# Patient Record
Sex: FEMALE | Race: WHITE | NOT HISPANIC OR LATINO | Employment: OTHER | URBAN - METROPOLITAN AREA
[De-identification: names, ages, dates, MRNs, and addresses within clinical notes are randomized per-mention and may not be internally consistent; named-entity substitution may affect disease eponyms.]

---

## 2017-01-05 ENCOUNTER — TELEPHONE (OUTPATIENT)
Dept: FAMILY MEDICINE | Facility: CLINIC | Age: 82
End: 2017-01-05

## 2017-01-05 ENCOUNTER — PATIENT OUTREACH (OUTPATIENT)
Dept: ADMINISTRATIVE | Facility: CLINIC | Age: 82
End: 2017-01-05
Payer: MEDICARE

## 2017-01-05 DIAGNOSIS — R55 SYNCOPE, UNSPECIFIED SYNCOPE TYPE: Primary | ICD-10-CM

## 2017-01-05 NOTE — TELEPHONE ENCOUNTER
----- Message from Philomena Whitehead sent at 1/4/2017  3:52 PM CST -----  Contact: Anjelica with [facility] at [phone]  Anjelica with [facility] at [phone], Is calling for confirmation that Dr Lind will follow this patient for Home Health services. And book appointment for Hospital follow up before 1/31/17. Please advise. Thanks

## 2017-01-05 NOTE — TELEPHONE ENCOUNTER
They must have set her up at hospital?  I will follow her though as she is my patient.  Also can get in with Dr. Taylor for hospital f/u before visit with me.

## 2017-01-05 NOTE — PATIENT INSTRUCTIONS
"Near-Fainting:Uncertain Cause  Fainting (syncope) is a temporary loss of consciousness ("passing out"). It occurs when blood flow to the brain is reduced. Near-fainting ("near-syncope") is like fainting, but you do not fully "pass out."  The common minor causes of near fainting include sudden fear, pain, emotional stress, overexertion, or quickly standing up after sitting or lying for a long time.  The more serious causes for near fainting are due to either a very slow or very fast heart beat, dehydration, anemia, blood loss, problems related to the heart, or taking too much high blood pressure medicine.  The exact cause of your episode is not certain. More tests may be required. Therefore, it is important that you follow up with your doctor as advised.  Home Care:  1) Rest today. Resume your normal activities as soon as you are feeling back to normal.  2) If you become light-headed or dizzy, lie down right away or sit with your head between your knees.  3) Because we do not know the exact cause of your near fainting spell, another spell could occur without warning. Therefore, do not drive a car or use dangerous equipment. D o not take a bath alone (use a shower instead). Do not swim alone. You can resume these activities when your doctor says that you are no longer in danger of having a near fainting spell.  4) Stay well hydrated by drinking enough fluid each day.  Follow Up  with your doctor as instructed.  Get Prompt Medical Attention  if any of the following occur:  -- Another fainting spell occurs, and it is not explained by the common causes listed above  -- Chest, arm, neck, jaw, back or abdominal pain  -- Shortness of breath  -- Weakness, tingling or numbness in one side of the face, one arm or leg  -- Slurred speech, confusion, trouble walking or seeing  -- Seizure  -- Blood in vomit, stools (black or red color)  -- (In women) unexpected vaginal bleeding  © 9524-2297 [employer], [address] [facility], " [place] [address]. All rights reserved. This information is not intended as a substitute for professional medical care. Always follow your healthcare professional's instructions.

## 2017-01-10 NOTE — TELEPHONE ENCOUNTER
Spoke to tiffany and notified her that Dr. Lind will follow the pt but the pt needs to be seen by Dr. Taylor first. Tiffany verbalized understanding.

## 2017-01-13 ENCOUNTER — TELEPHONE (OUTPATIENT)
Dept: FAMILY MEDICINE | Facility: CLINIC | Age: 82
End: 2017-01-13

## 2017-01-13 NOTE — TELEPHONE ENCOUNTER
Why send that info? Is she symptomatic? If so go to ER otherwise monitor home bp and report back Monday.

## 2017-01-13 NOTE — TELEPHONE ENCOUNTER
Spoke to pt nurse and gave her the recommendation from dr euceda. She states pt was not symptomatic, but she has to report off to you when it flags as red in their system.

## 2017-01-13 NOTE — TELEPHONE ENCOUNTER
----- Message from Kat May sent at 1/13/2017  3:26 PM CST -----  America Frances with [facility] therapy stated patient's sitting blood pressure was 198/98 heart rate 70 /standing 164-81/if any questions call back at [phone].

## 2017-01-17 ENCOUNTER — TELEPHONE (OUTPATIENT)
Dept: PRIMARY CARE CLINIC | Facility: CLINIC | Age: 82
End: 2017-01-17

## 2017-01-17 ENCOUNTER — OFFICE VISIT (OUTPATIENT)
Dept: PRIMARY CARE CLINIC | Facility: CLINIC | Age: 82
End: 2017-01-17
Payer: MEDICARE

## 2017-01-17 ENCOUNTER — HOSPITAL ENCOUNTER (OUTPATIENT)
Dept: RADIOLOGY | Facility: HOSPITAL | Age: 82
Discharge: HOME | End: 2017-01-17
Attending: FAMILY MEDICINE
Payer: MEDICARE

## 2017-01-17 VITALS
SYSTOLIC BLOOD PRESSURE: 169 MMHG | RESPIRATION RATE: 16 BRPM | HEIGHT: 62 IN | BODY MASS INDEX: 24.14 KG/M2 | WEIGHT: 131.19 LBS | DIASTOLIC BLOOD PRESSURE: 62 MMHG | HEART RATE: 87 BPM

## 2017-01-17 DIAGNOSIS — M25.551 HIP PAIN, ACUTE, RIGHT: ICD-10-CM
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
## 2017-01-17 DIAGNOSIS — I95.1 ORTHOSTATIC HYPOTENSION: ICD-10-CM

## 2017-01-17 PROCEDURE — 73562 X-RAY EXAM OF KNEE 3: CPT | Mod: TC,PO,RT

## 2017-01-17 PROCEDURE — 99999 PR PBB SHADOW E&M-EST. PATIENT-LVL III: CPT | Mod: PBBFAC,,, | Performed by: FAMILY MEDICINE

## 2017-01-17 PROCEDURE — 73502 X-RAY EXAM HIP UNI 2-3 VIEWS: CPT | Mod: 26,RT,, | Performed by: RADIOLOGY

## 2017-01-17 PROCEDURE — 73562 X-RAY EXAM OF KNEE 3: CPT | Mod: 26,RT,, | Performed by: RADIOLOGY

## 2017-01-17 PROCEDURE — 73502 X-RAY EXAM HIP UNI 2-3 VIEWS: CPT | Mod: TC,PO,RT

## 2017-01-17 RX ORDER — ASCORBIC ACID 500 MG
500 TABLET ORAL DAILY
COMMUNITY
End: 2018-02-15

## 2017-01-17 RX ORDER — PANTOPRAZOLE SODIUM 40 MG/1
TABLET, DELAYED RELEASE ORAL
Refills: 11 | COMMUNITY
Start: 2017-01-05 | End: 2017-02-13 | Stop reason: SDUPTHER

## 2017-01-17 RX ORDER — FERROUS SULFATE 325(65) MG
325 TABLET, DELAYED RELEASE (ENTERIC COATED) ORAL DAILY
COMMUNITY

## 2017-01-17 NOTE — MR AVS SNAPSHOT
[facility]  [address] [facility] [address]  [place] [address]  Phone: [phone]                  Michela Martinez   2017 11:40 AM   Office Visit    Description:  Female : 1929   Provider:  Vivi Taylor MD   Department:  [facility]           Reason for Visit     Leg Pain           Diagnoses this Visit        Comments    Fall, subsequent encounter    -  Primary            To Do List           Future Appointments        Provider Department Dept Phone    3/21/2017 10:15 AM LAB, [place] [facility] [phone]      Goals (5 Years of Data)     None      [facility] On Call     [facility] Care [facility] -  Assistance  Registered nurses in the [facility] provide clinical advisement, health education, appointment booking, and other advisory services.  Call for this free service at [phone].             Medications           Message regarding Medications     Verify the changes and/or additions to your medication regime listed below are the same as discussed with your clinician today.  If any of these changes or additions are incorrect, please notify your healthcare provider.             Verify that the below list of medications is an accurate representation of the medications you are currently taking.  If none reported, the list may be blank. If incorrect, please contact your healthcare provider. Carry this list with you in case of emergency.           Current Medications     acetaminophen (TYLENOL) 325 MG tablet Take 325 mg by mouth every 6 (six) hours as needed for Pain.    ascorbic acid, vitamin C, (VITAMIN C) 500 MG tablet Take 500 mg by mouth once daily.    atorvastatin (LIPITOR) 10 MG tablet Take 1 tablet (10 mg total) by mouth every evening.    calcium-vitamin D3 (CALCIUM 500 + D) 500 mg(1,250mg) -200 unit per tablet Take 1 tablet by mouth 2 (two) times daily with meals.    clopidogrel (PLAVIX) 75 mg tablet Take 1 tablet (75 mg  "total) by mouth every morning.    cyanocobalamin (VITAMIN B-12) 500 MCG tablet Take 500 mcg by mouth every evening.     ferrous sulfate 325 (65 FE) MG EC tablet Take 325 mg by mouth once daily.    lamotrigine (LAMICTAL) 150 MG Tab Take 1 tablet (150 mg total) by mouth 2 (two) times daily.    lisinopril (PRINIVIL,ZESTRIL) 2.5 MG tablet Take 2 tablets (5 mg total) by mouth every evening.    memantine (NAMENDA) 10 MG Tab Take 1 tablet (10 mg total) by mouth 2 (two) times daily. Twice a day    midodrine (PROAMATINE) 2.5 MG Tab Take 1 tablet (2.5 mg total) by mouth 2 (two) times daily with meals. Take at 9am with morning medications and at 3pm    MULTIVIT-MINERALS/FOLIC ACID (WOMEN'S MULTIVITAMIN GUMMIES ORAL) Take 1 capsule by mouth 2 (two) times daily.    oxybutynin (DITROPAN) 5 MG Tab 1/2 tab po bid    vitamin E 1000 UNIT capsule Take 1,000 Units by mouth once daily.    pantoprazole (PROTONIX) 40 MG tablet TK 1 T PO  QAM 30 MINUTES BEFORE BREAKFAST           Clinical Reference Information           Vital Signs - Last Recorded  Most recent update: 1/17/2017 12:26 PM by Merry Galan LPN    BP Pulse Resp Ht Wt BMI    (!) 169/62 87 16 5' 2" (1.575 m) 59.5 kg (131 lb 2.8 oz) 23.99 kg/m2      Blood Pressure          Most Recent Value    BP  (!)  169/62      Allergies as of 1/17/2017     No Known Allergies      Immunizations Administered on Date of Encounter - 1/17/2017     None      Orders Placed During Today's Visit     Future Labs/Procedures Expected by Expires    X-Ray Hip 2 View Right  1/17/2017 1/17/2018    X-Ray Knee 3 View Right  1/17/2017 1/17/2018      [facility] Sign-Up     Activating your [facility] account is as easy as 1-2-3!     1) Visit [email or web address], select Sign Up Now, enter this activation code and your date of birth, then select Next.  [email or web address]  Expires: 2/18/2017 12:16 PM      2) Create a username and password to use when you visit [facility] in the future and select a security question in " case you lose your password and select Next.    3) Enter your e-mail address and click Sign Up!    Additional Information  If you have questions, please e-mail [email or web address] or call [phone] to talk to our [facility] staff. Remember, [facility] is NOT to be used for urgent needs. For medical emergencies, dial 911.

## 2017-01-17 NOTE — PROGRESS NOTES
Subjective:       Patient ID: Michela Martinez is a 87 y.o. female.    Chief Complaint: Leg Pain (right )    Transitional Care Note    Family and/or Caretaker present at visit?  Yes. Gilberto  Diagnostic tests reviewed/disposition: I have reviewed all completed as well as pending diagnostic tests at the time of discharge.  Disease/illness education: orthostatic hypotension  Home health/community services discussion/referrals: home health  Establishment or re-establishment of referral orders for community resources: No other necessary community resources.   Discussion with other health care providers: No discussion with other health care providers necessary.     Patient was hospitalized at [facility] recently. I have the hospital records and reviewed them thoroughly. During our Priority Care visit, we discussed how patient has been doing post discharge and identified any post discharge needs and concerns. I have also personally in addition to my nurse have reviewed and reconciled every medication before and after discharge. The patient has a clear understanding of what medications are currently to be taken after time was spent clarifying or correcting the medication reconciliation. The coordination of care far as follow up appointments, home health, or DME services have been done. My priority care team nursing will do follow up 24 or 48 hour phone call after this visit.         Admit Date: 12/30/2016     Discharge Date and Time: 1/4/2017  Attending Physician: Elvira Crawford MD      Reason for Admission: Syncope     Procedures Performed: * No surgery found *     Hospital Course (synopsis of major diagnoses, care, treatment, and services provided during the course of the hospital stay): 86 yo F with h/o CEA, presents with recurrent syncopal events over the past week, fourth event last night, fall was unwitnessed as patient climbed into bed and staff noted that she had a black eye this morning. 4 days  ago family member noted that patient was standing when suddenly legs gave out and patient almost fell however she was caught by her granddaughter. She had brief loss of consciousness about 30 seconds and returned quickly to baseline. Patient had an episode of urinary incontinence without event. No facial droop or focal weakness noted. She was assessed in the emergency department and had a CT head unremarkable for any acute findings. Twelve-lead EKG shows sinus bradycardia with a heart rate around 59. Patient is hypertensive. She denies any chest pain or palpitations at this time. Department Hospital medicine has been asked to admit for further evaluation of recurrent syncope and found to be profoundly orthostatic and symptomatic.       She was initially treated with IVFs, discontinued 1/1. Remains hypertensive with BP 200s while supine but then drops with standing. Patient initially was not able to ambulate much due to orthostasis. She was treated with discontinuation of Toprol and then also Norvasc. Compression stockings placed and started on midodrine. Patient seen by PT/OT and noted to have improved ambulation and improved symptoms. She is instructed to ambulate slowly to allow body to adjust to positional changes. Son instructed to check orthostatic vitals twice daily for the next week and keep log to take to clinic. Patient to go back to [facility] with HH/sitters.     Consults: neurology     Significant Diagnostic Studies: Labs:   Radiology:        Imaging Results                 US Carotid Bilateral (Final result) Result time: 12/30/16 18:12:20          Final result by Jenna Morgan MD (12/30/16 18:12:20)          Impression:     No significant stenosis of the right internal carotid.     50-69 percent stenosis of the left internal carotid.     Antegrade flow in both vertebrals.               Electronically signed by: JENNA MORGAN MD  Date:     12/30/16  Time:    18:12           Narrative:     Bilateral  color flow duplex imaging of the carotids.     Clinical history is syncope, previous endarterectomy on the right.     The right common carotid shows no significant disease. In the right palm proximal internal carotid there is a small amount of plaque present. The peak systolic velocity is 96 cm/s. The right vertebral shows antegrade flow.     The left common carotid shows no significant disease. In the left bulb and proximal internal carotid there is a portion of the vessel that is obscured by calcified plaque. The peak systolic velocity detected is 156 cm/s. The internal to common ratio is 2.5. These are both indicative of 50-69 percent stenosis. The left vertebral shows antegrade flow.                           MRI Brain Without Contrast (Final result) Result time: 12/30/16 17:31:05          Final result by Jenna Morgan MD (12/30/16 17:31:05)          Impression:     Chronic changes no superimposed acute disease is seen     Paranasal sinusitis.        Electronically signed by: JENNA MORGAN MD  Date:     12/30/16  Time:    17:31           Narrative:     MRI of the brain without contrast     Clinical history is trauma     This is compared to a CT scan from earlier in the day exam is degraded by artifact.     There is atrophy. There is increased signal in the periventricular white matter consistent with ischemia. There is no hemorrhage or extra-axial fluid collections noted. No masses are seen .no acute infarcts are noted. There are normal flow-voids seen in the carotid sinuses. The pituitary appears normal. There is paranasal sinusitis.                      CT Maxillofacial Without Contrast (Final result) Result time: 12/30/16 13:37:39          Final result by Remberto Lira MD (12/30/16 13:37:39)          Impression:        1. No displaced fracture or dislocation is appreciated.        Electronically signed by: REMBERTO LIRA MD  Date:     12/30/16  Time:    13:37           Narrative:     CT FACE WITHOUT CONTRAST  12/30/2016 at 1330     Clinical data: Clinical history is provided of fall, head and facial pain with skin laceration and bruising left more so than right anteriorly.     Comparison: CT head report same date. No previous CT head is currently available     Technique: Axial CT noncontrast images of the face were obtained. Coronal and sagittal reconstructions were obtained. Total DLP for the study is approximately 741 mGy-cm. Automatic exposure control was utilized.     Findings: There is metallic streak artifact from the oral cavity. There is some marginal mucosal thickening with lobulated contour left maxillary more so than right which could also be seen with retention cystic change. Chronic appearing periodontal disease changes are present. Postsurgical neck changes are present with clips on the right. There is symmetric overall appearance of the salivary glandular tissues. Degenerative changes of the mandibular condyles are present. The osteomeatal units are grossly patent overall. There is chronic appearing degeneration with straightening of the cervical curvature along with spurring and multilevel disc space narrowing. There is also marginal chronic appearing degenerative anterolisthesis with maintained posterior margins at C3-4. No abnormal prevertebral soft tissue swelling is appreciated. There is slight soft tissue swelling pre-zygomatic on the left.                           CT Head Without Contrast (Final result) Result time: 12/30/16 13:33:55          Final result by Bernardo Taylor MD (12/30/16 13:33:55)          Impression:        1. No acute intracranial abnormality is visualized.  2. Generalized involutional changes are seen. There is prominence of the ventricular system which appears disproportionate to the sulci. Therefore, hydrocephalus including normal pressure hydrocephalus is not excluded. Please correlate clinically.  3. There is advanced periventricular and deep white matter low attenuation  which is nonspecific, but is most commonly associated with advanced chronic microvascular ischemic changes.        Electronically signed by: BERNARDO CUI MD  Date:     12/30/16  Time:    13:33           Narrative:     CT HEAD WITHOUT CONTRAST:     CPT 75342     HISTORY:  Fall. Head injury. Left eye region laceration.     TECHNIQUE: Multiple contiguous axial images were acquired from the base of the skull and the vertex without contrast administration. Total DLP for the study is 741 mGy-cm for the patient's CT head and CT face studies. AEC, (Automated Exposure Control) was utilized to reduce radiation dose to the patient.     FINDINGS:     Generalized delusional changes are seen. There is prominence of the ventricular system which appears disproportionate to the sulci. Hydrocephalus including normal pressure hydrocephalus is not excluded. No evidence of acute intracranial hemorrhage, mass effect, midline deviation, hydrocephalus, or abnormal extra-axial fluid collection is seen. 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 There is advanced periventricular and deep white matter low attenuation which is nonspecific, but it is most commonly associated with advanced chronic microvascular ischemic changes. No evidence of acute large vessel territory ischemia is seen. MRI with diffusion weighted imaging is more sensitive in the assessment of acute ischemia. The basilar cisterns are preserved. Intracranial atherosclerosis is demonstrated. The facial and paranasal sinus findings are reported separately. The mastoid air cells are grossly aerated. No acute displaced calvarial fracture is visualized.                           X-Ray Chest PA And Lateral (Final result) Result time: 12/30/16 13:06:17     Final result by Bernardo Cui MD (12/30/16 13:06:17)     Impression:         1. No evidence of lobar type of consolidation or acute cardiac decompensation is appreciated.           Electronically signed by:  CARLENE CUI MD  Date:     12/30/16  Time:    13:06      Narrative:     Chest, PA and lateral     CPT: 15036     Clinical data: Fall. Reported cough.     Comparison: Chest x-ray 06/06/2016     Findings: Frontal and lateral views of the chest were obtained. The cardiac silhouette is at the upper low-normal in size. Vascular calcifications are seen. Retrocardiac density with central lucency is again seen, compatible with a moderate-sized hiatal hernia. Pulmonary hyperinflation with flattening of the diaphragm suggestive of COPD is seen. Increased interstitial opacities are seen which may be related to chronic pulmonary parenchymal changes. Costochondral calcification averaging is noted bilaterally. Biapical pleural thickening/scarring is seen. No evidence of lobar type consolidation, visible pneumothorax, or pleural effusion is seen. The visualized osseous structures appear demineralized. No definite acute displaced fracture is radiographically conspicuous. Surgical clips project over the right neck..                   Cardiac Graphics: Echocardiogram:   2D echo with color flow doppler:   Results for orders placed or performed during the hospital encounter of 12/30/16   2D echo with color flow doppler   Result Value Ref Range     EF 68 55 - 65     Mitral Valve Regurgitation MILD       Diastolic Dysfunction Yes (A)       Est. PA Systolic Pressure 24       Pericardial Effusion SMALL (A)       Tricuspid Valve Regurgitation TRIVIAL           Final Diagnoses:   Principal Problem: Syncope  Secondary Diagnoses:       HPI  Review of Systems   Constitutional: Positive for activity change, appetite change and fatigue.   HENT: Negative.    Eyes: Negative.    Respiratory: Negative.    Cardiovascular: Negative.    Gastrointestinal: Negative.    Endocrine: Negative.    Genitourinary: Negative.    Musculoskeletal: Positive for arthralgias, back pain, gait problem and joint swelling.   Skin: Negative.    Allergic/Immunologic:  "Negative.    Neurological: Positive for weakness.   Psychiatric/Behavioral: Positive for confusion.       Objective:       Vitals:    01/17/17 1222   BP: (!) 169/62   Pulse: 87   Resp: 16   Weight: 59.5 kg (131 lb 2.8 oz)   Height: 5' 2" (1.575 m)       Physical Exam   Constitutional: She appears distressed.   HENT:   Head: Normocephalic.   Mouth/Throat: No oropharyngeal exudate.   Eyes: Right eye exhibits no discharge. No scleral icterus.   Neck: Normal range of motion. Neck supple.   Cardiovascular: Normal rate and regular rhythm.    No murmur heard.  Pulmonary/Chest: Effort normal and breath sounds normal. No respiratory distress. She has no wheezes.   Abdominal: Soft. Bowel sounds are normal. She exhibits no distension. There is no tenderness.   Musculoskeletal: She exhibits edema and tenderness.   Right hip tenderness   Neurological: She displays abnormal reflex. No cranial nerve deficit. She exhibits abnormal muscle tone. Coordination abnormal.   Skin: Skin is warm. She is not diaphoretic.         Lab Results   Component Value Date    WBC 6.24 01/17/2017    HGB 11.8 (L) 01/17/2017    HCT 35.5 (L) 01/17/2017    MCV 98 01/17/2017     01/17/2017     CMP  Sodium   Date Value Ref Range Status   01/17/2017 146 (H) 136 - 145 mmol/L Final     Potassium   Date Value Ref Range Status   01/17/2017 4.4 3.5 - 5.1 mmol/L Final     Chloride   Date Value Ref Range Status   01/17/2017 107 95 - 110 mmol/L Final     CO2   Date Value Ref Range Status   01/17/2017 26 23 - 29 mmol/L Final     Glucose   Date Value Ref Range Status   01/17/2017 93 70 - 110 mg/dL Final     BUN, Bld   Date Value Ref Range Status   01/17/2017 33 (H) 8 - 23 mg/dL Final     Creatinine   Date Value Ref Range Status   01/17/2017 1.6 (H) 0.5 - 1.4 mg/dL Final     Calcium   Date Value Ref Range Status   01/17/2017 9.4 8.7 - 10.5 mg/dL Final     Total Protein   Date Value Ref Range Status   01/17/2017 6.5 6.0 - 8.4 g/dL Final     Albumin   Date Value " Ref Range Status   01/17/2017 3.6 3.5 - 5.2 g/dL Final     Total Bilirubin   Date Value Ref Range Status   01/17/2017 0.5 0.1 - 1.0 mg/dL Final     Comment:     For infants and newborns, interpretation of results should be based  on gestational age, weight and in agreement with clinical  observations.  Premature Infant recommended reference ranges:  Up to 24 hours.............<8.0 mg/dL  Up to 48 hours............<12.0 mg/dL  3-5 days..................<15.0 mg/dL  6-29 days.................<15.0 mg/dL       Alkaline Phosphatase   Date Value Ref Range Status   01/17/2017 145 (H) 55 - 135 U/L Final     AST ([place])   Date Value Ref Range Status   01/17/2016 36 14 - 36 U/L Final     AST   Date Value Ref Range Status   01/17/2017 15 10 - 40 U/L Final     ALT   Date Value Ref Range Status   01/17/2017 15 10 - 44 U/L Final     Anion Gap   Date Value Ref Range Status   01/17/2017 13 8 - 16 mmol/L Final     eGFR if    Date Value Ref Range Status   01/17/2017 33 (A) >60 mL/min/1.73 m^2 Final     eGFR if non    Date Value Ref Range Status   01/17/2017 29 (A) >60 mL/min/1.73 m^2 Final     Comment:     Calculation used to obtain the estimated glomerular filtration  rate (eGFR) is the CKD-EPI equation. Since race is unknown   in our information system, the eGFR values for   -American and Non--American patients are given   for each creatinine result.         Assessment and plan    Profound orthostatic hypotension  For now trying the see-saw effect of midodrine and lisinopril and see how it goes  No perfect solution  Compression hoses, slow sitting to standing, reviewed compliant bp log    Right hip pain, persistent after fall  Did not xray in hospital  Will check here    Anemia of chronic disease  Iron and vitamin c

## 2017-01-18 NOTE — TELEPHONE ENCOUNTER
Reviewed xrays both right hip and knee  Please notify no acute fracture  Please set up an ortho appt

## 2017-01-19 ENCOUNTER — TELEPHONE (OUTPATIENT)
Dept: PRIMARY CARE CLINIC | Facility: CLINIC | Age: 82
End: 2017-01-19

## 2017-01-19 NOTE — TELEPHONE ENCOUNTER
Spoke with HH, asked if they wanted us to send over orders for them to continue HH. Stated yes. Orders being for Dr Taylor to sign for pt to continue HH

## 2017-01-19 NOTE — TELEPHONE ENCOUNTER
----- Message from Kat May sent at 1/19/2017  2:54 PM CST -----  [facility] requesting to speak with nurse concerning signing home health orders/please call back at [phone] to advise.

## 2017-01-19 NOTE — TELEPHONE ENCOUNTER
Spoke with pts daughter Tuesday 1/17/19, notified of results and recommendations. Daughter states she will call back to schedule ortho appts

## 2017-01-30 ENCOUNTER — PATIENT OUTREACH (OUTPATIENT)
Dept: ADMINISTRATIVE | Facility: HOSPITAL | Age: 82
End: 2017-01-30

## 2017-01-30 NOTE — LETTER
January 30, 2017    Michela Martinez  [address] [place] [id number]             [facility]  [address]  [place] [address]  Phone: [phone] Dear Mrs. Martinez:    [facility] is committed to your overall health.  To help you get the most out of each of your visits, we will review your information to make sure you are up to date on all of your recommended tests and/or procedures.      Dr. Lind         has found that you may be due for:    Tetanus immunization  Pneumonia immunization    If you have had any of the above done at another facility, please bring the records or information with you so that your record at [facility] will be complete.     If you are currently taking medication , please bring it with you to your appointment for review.    If you have any questions or concerns, please don't hesitate to call.    Sincerely,  Brionna Griggs  Clinical Care Coordinator  [facility] [address] [facility] [address]  [place] [address]  Phone: [phone]   Fax: [phone]

## 2017-02-01 PROBLEM — E86.1 INTRAVASCULAR VOLUME DEPLETION: Status: ACTIVE | Noted: 2017-02-01

## 2017-02-01 PROBLEM — K92.2 ACUTE LOWER GI BLEEDING: Status: ACTIVE | Noted: 2017-02-01

## 2017-02-01 PROBLEM — R91.1 NODULE OF RIGHT LUNG: Status: ACTIVE | Noted: 2017-02-01

## 2017-02-01 PROBLEM — K52.9 COLITIS: Status: ACTIVE | Noted: 2017-02-01

## 2017-02-01 PROBLEM — N17.9 AKI (ACUTE KIDNEY INJURY): Status: ACTIVE | Noted: 2017-02-01

## 2017-02-01 PROBLEM — K52.9 COLITIS, ACUTE: Status: ACTIVE | Noted: 2017-02-01

## 2017-02-02 PROCEDURE — 99495 TRANSJ CARE MGMT MOD F2F 14D: CPT | Mod: S$PBB,,, | Performed by: FAMILY MEDICINE

## 2017-02-03 PROBLEM — K52.9 COLITIS: Status: ACTIVE | Noted: 2017-02-03

## 2017-02-04 PROBLEM — K55.039 ACUTE ISCHEMIC COLITIS: Status: ACTIVE | Noted: 2017-02-04

## 2017-02-06 ENCOUNTER — PATIENT OUTREACH (OUTPATIENT)
Dept: ADMINISTRATIVE | Facility: CLINIC | Age: 82
End: 2017-02-06
Payer: MEDICARE

## 2017-02-07 PROBLEM — K52.9 COLITIS, ACUTE: Status: ACTIVE | Noted: 2017-02-07

## 2017-02-13 ENCOUNTER — OFFICE VISIT (OUTPATIENT)
Dept: FAMILY MEDICINE | Facility: CLINIC | Age: 82
End: 2017-02-13
Payer: MEDICARE

## 2017-02-13 VITALS
SYSTOLIC BLOOD PRESSURE: 148 MMHG | BODY MASS INDEX: 24.59 KG/M2 | HEART RATE: 74 BPM | RESPIRATION RATE: 18 BRPM | DIASTOLIC BLOOD PRESSURE: 72 MMHG | WEIGHT: 133.63 LBS | HEIGHT: 62 IN | OXYGEN SATURATION: 98 %

## 2017-02-13 DIAGNOSIS — M85.80 OSTEOPENIA: ICD-10-CM

## 2017-02-13 DIAGNOSIS — Z23 NEED FOR PNEUMOCOCCAL VACCINATION: ICD-10-CM

## 2017-02-13 DIAGNOSIS — I73.9 PVD (PERIPHERAL VASCULAR DISEASE): Chronic | ICD-10-CM

## 2017-02-13 DIAGNOSIS — M43.10 SPONDYLOLISTHESIS, UNSPECIFIED SPINAL REGION: ICD-10-CM

## 2017-02-13 DIAGNOSIS — M51.36 DDD (DEGENERATIVE DISC DISEASE), LUMBAR: ICD-10-CM

## 2017-02-13 DIAGNOSIS — N18.30 CHRONIC KIDNEY DISEASE (CKD), STAGE III (MODERATE): Chronic | ICD-10-CM

## 2017-02-13 DIAGNOSIS — D69.6 THROMBOCYTOPENIA: ICD-10-CM

## 2017-02-13 DIAGNOSIS — E78.5 HYPERLIPIDEMIA LDL GOAL <100: ICD-10-CM

## 2017-02-13 DIAGNOSIS — I10 ESSENTIAL HYPERTENSION: Primary | Chronic | ICD-10-CM

## 2017-02-13 DIAGNOSIS — M48.061 NEURAL FORAMINAL STENOSIS OF LUMBAR SPINE: ICD-10-CM

## 2017-02-13 DIAGNOSIS — M48.061 LUMBAR STENOSIS: ICD-10-CM

## 2017-02-13 DIAGNOSIS — Z86.73 HISTORY OF TIA (TRANSIENT ISCHEMIC ATTACK): ICD-10-CM

## 2017-02-13 DIAGNOSIS — F03.90 DEMENTIA WITHOUT BEHAVIORAL DISTURBANCE, UNSPECIFIED DEMENTIA TYPE: ICD-10-CM

## 2017-02-13 DIAGNOSIS — M71.38 SYNOVIAL CYST OF LUMBAR FACET JOINT: ICD-10-CM

## 2017-02-13 DIAGNOSIS — I95.1 ORTHOSTATIC HYPOTENSION: ICD-10-CM

## 2017-02-13 PROBLEM — K55.039 ACUTE ISCHEMIC COLITIS: Status: RESOLVED | Noted: 2017-02-04 | Resolved: 2017-02-13

## 2017-02-13 PROBLEM — E86.1 INTRAVASCULAR VOLUME DEPLETION: Status: RESOLVED | Noted: 2017-02-01 | Resolved: 2017-02-13

## 2017-02-13 PROBLEM — R91.1 NODULE OF RIGHT LUNG: Status: RESOLVED | Noted: 2017-02-01 | Resolved: 2017-02-13

## 2017-02-13 PROBLEM — K52.9 COLITIS, ACUTE: Status: RESOLVED | Noted: 2017-02-07 | Resolved: 2017-02-13

## 2017-02-13 PROBLEM — N17.9 AKI (ACUTE KIDNEY INJURY): Status: RESOLVED | Noted: 2017-02-01 | Resolved: 2017-02-13

## 2017-02-13 PROCEDURE — 99214 OFFICE O/P EST MOD 30 MIN: CPT | Mod: S$PBB,,, | Performed by: FAMILY MEDICINE

## 2017-02-13 PROCEDURE — 99999 PR PBB SHADOW E&M-EST. PATIENT-LVL III: CPT | Mod: PBBFAC,,, | Performed by: FAMILY MEDICINE

## 2017-02-13 PROCEDURE — 99213 OFFICE O/P EST LOW 20 MIN: CPT | Mod: PBBFAC,PO | Performed by: FAMILY MEDICINE

## 2017-02-13 PROCEDURE — 90732 PPSV23 VACC 2 YRS+ SUBQ/IM: CPT | Mod: PBBFAC,PO | Performed by: FAMILY MEDICINE

## 2017-02-13 RX ORDER — LISINOPRIL 2.5 MG/1
5 TABLET ORAL NIGHTLY
Qty: 180 TABLET | Refills: 3 | Status: SHIPPED | OUTPATIENT
Start: 2017-02-13 | End: 2017-02-13

## 2017-02-13 RX ORDER — LAMOTRIGINE 150 MG/1
150 TABLET ORAL 2 TIMES DAILY
Qty: 180 TABLET | Refills: 3 | Status: SHIPPED | OUTPATIENT
Start: 2017-02-13

## 2017-02-13 RX ORDER — CLOPIDOGREL BISULFATE 75 MG/1
75 TABLET ORAL EVERY MORNING
Qty: 90 TABLET | Refills: 3 | Status: ON HOLD | OUTPATIENT
Start: 2017-02-13 | End: 2018-03-05

## 2017-02-13 RX ORDER — LISINOPRIL 5 MG/1
5 TABLET ORAL DAILY
Qty: 90 TABLET | Refills: 3 | Status: ON HOLD | OUTPATIENT
Start: 2017-02-13 | End: 2018-02-21

## 2017-02-13 RX ORDER — PANTOPRAZOLE SODIUM 40 MG/1
TABLET, DELAYED RELEASE ORAL
Qty: 90 TABLET | Refills: 3 | Status: SHIPPED | OUTPATIENT
Start: 2017-02-13 | End: 2017-05-25 | Stop reason: SDUPTHER

## 2017-02-13 RX ORDER — ATORVASTATIN CALCIUM 10 MG/1
10 TABLET, FILM COATED ORAL NIGHTLY
Qty: 90 TABLET | Refills: 3 | Status: SHIPPED | OUTPATIENT
Start: 2017-02-13

## 2017-02-13 RX ORDER — OXYBUTYNIN CHLORIDE 5 MG/1
TABLET ORAL
Qty: 90 TABLET | Refills: 3 | Status: ON HOLD | OUTPATIENT
Start: 2017-02-13 | End: 2018-02-21

## 2017-02-13 RX ORDER — MEMANTINE HYDROCHLORIDE 10 MG/1
10 TABLET ORAL 2 TIMES DAILY
Qty: 180 TABLET | Refills: 3 | Status: SHIPPED | OUTPATIENT
Start: 2017-02-13 | End: 2017-04-10

## 2017-02-13 RX ORDER — MIDODRINE HYDROCHLORIDE 2.5 MG/1
2.5 TABLET ORAL 2 TIMES DAILY WITH MEALS
Qty: 180 TABLET | Refills: 3 | Status: SHIPPED | OUTPATIENT
Start: 2017-02-13 | End: 2017-11-28 | Stop reason: SDUPTHER

## 2017-02-13 NOTE — MR AVS SNAPSHOT
[facility]  [address] [facility] [address] [place] [address]  Phone: [phone]  Fax: [phone]                  Michela Martinez   2017 1:40 PM   Office Visit    Description:  Female : 1929   Provider:  MAGGIE Lind MD   Department:  [facility]           Reason for Visit     Follow-up           Diagnoses this Visit        Comments    Essential hypertension    -  Primary     Dementia without behavioral disturbance, unspecified dementia type         PVD (peripheral vascular disease)         Orthostatic hypotension         Chronic kidney disease (CKD), stage III (moderate)         Hyperlipidemia LDL goal <100         Osteopenia         Neural foraminal stenosis of lumbar spine         DDD (degenerative disc disease), lumbar         Spondylolisthesis, unspecified spinal region         Lumbar stenosis         History of TIA (transient ischemic attack)         Synovial cyst of lumbar facet joint         Thrombocytopenia         Need for pneumococcal vaccination                To Do List           Future Appointments        Provider Department Dept Phone    3/21/2017 10:15 AM LAB, [place] [facility] [phone]    2017 10:40 AM MAGGIE Lind MD [facility] [phone]      Goals (5 Years of Data)     None      Follow-Up and Disposition     Return in about 3 months (around 2017), or if symptoms worsen or fail to improve.       These Medications        Disp Refills Start End    atorvastatin (LIPITOR) 10 MG tablet 90 tablet 3 2017     Take 1 tablet (10 mg total) by mouth every evening. - Oral    Pharmacy: [facility] [address] [place] [address] [address] AT [facility] [address] Ph #: [phone]       clopidogrel (PLAVIX) 75 mg tablet 90 tablet 3 2017     Take 1 tablet (75 mg total) by mouth every morning. - Oral    Pharmacy: [facility] [address] [facility] [address]  [place] [address] AT [place] [address] Ph #: [phone]       lamotrigine (LAMICTAL) 150 MG Tab 180 tablet 3 2/13/2017     Take 1 tablet (150 mg total) by mouth 2 (two) times daily. - Oral    Pharmacy: [facility] [place] [address] [address] [place] [address] Ph #: [phone]       memantine (NAMENDA) 10 MG Tab 180 tablet 3 2/13/2017     Take 1 tablet (10 mg total) by mouth 2 (two) times daily. Twice a day - Oral    Pharmacy: [place] [address] [address] [place] [address] Ph #: [phone]       midodrine (PROAMATINE) 2.5 MG Tab 180 tablet 3 2/13/2017 2/13/2018    Take 1 tablet (2.5 mg total) by mouth 2 (two) times daily with meals. Take at 9am with morning medications and at 3pm - Oral    Pharmacy: [facility] [place] [address] [address] [place] [address] Ph #: [phone]       oxybutynin (DITROPAN) 5 MG Tab 90 tablet 3 2/13/2017     1/2 tab po bid    Pharmacy: [facility] [place] [address] AT [place] [address] & [place] [address] Ph #: [phone]       Notes to Pharmacy: **Patient requests 90 days supply**    pantoprazole (PROTONIX) 40 MG tablet 90 tablet 3 2/13/2017     TK 1 T PO  QAM 30 MINUTES BEFORE BREAKFAST    Pharmacy: [facility] [place] [address] [address] [place] [address] Ph #: [phone]       lisinopril (PRINIVIL,ZESTRIL) 5 MG tablet 90 tablet 3 2/13/2017 2/13/2018    Take 1 tablet (5 mg total) by mouth once daily. - Oral    Pharmacy: [facility] [address] [facility] [address] AT [facility] [address] Ph #: [phone]         [facility] On Call     [facility] Nurse Care Line - 24/7 Assistance  Registered nurses in the [facility] provide clinical advisement, health education, appointment booking, and other advisory services.  Call for this free service at [phone].             Medications            Message regarding Medications     Verify the changes and/or additions to your medication regime listed below are the same as discussed with your clinician today.  If any of these changes or additions are incorrect, please notify your healthcare provider.        START taking these NEW medications        Refills    lisinopril (PRINIVIL,ZESTRIL) 5 MG tablet 3    Sig: Take 1 tablet (5 mg total) by mouth once daily.    Class: Normal    Route: Oral      STOP taking these medications     triamcinolone acetonide injection 80 mg     ciprofloxacin HCl (CIPRO) 500 MG tablet Take 1 tablet (500 mg total) by mouth 2 (two) times daily.    metronidazole (FLAGYL) 500 MG tablet Take 1 tablet (500 mg total) by mouth 3 (three) times daily.           Verify that the below list of medications is an accurate representation of the medications you are currently taking.  If none reported, the list may be blank. If incorrect, please contact your healthcare provider. Carry this list with you in case of emergency.           Current Medications     acetaminophen (TYLENOL) 325 MG tablet Take 325 mg by mouth every 6 (six) hours as needed for Pain.    ascorbic acid, vitamin C, (VITAMIN C) 500 MG tablet Take 500 mg by mouth once daily.    atorvastatin (LIPITOR) 10 MG tablet Take 1 tablet (10 mg total) by mouth every evening.    calcium-vitamin D3 (CALCIUM 500 + D) 500 mg(1,250mg) -200 unit per tablet Take 1 tablet by mouth 2 (two) times daily with meals.    clopidogrel (PLAVIX) 75 mg tablet Take 1 tablet (75 mg total) by mouth every morning.    cyanocobalamin (VITAMIN B-12) 500 MCG tablet Take 500 mcg by mouth every evening.     ferrous sulfate 325 (65 FE) MG EC tablet Take 325 mg by mouth once daily.    lamotrigine (LAMICTAL) 150 MG Tab Take 1 tablet (150 mg total) by mouth 2 (two) times daily.    memantine (NAMENDA) 10 MG Tab Take 1 tablet (10 mg total) by mouth 2 (two) times daily. Twice a day    midodrine (PROAMATINE) 2.5 MG Tab Take 1  "tablet (2.5 mg total) by mouth 2 (two) times daily with meals. Take at 9am with morning medications and at 3pm    MULTIVIT-MINERALS/FOLIC ACID (WOMEN'S MULTIVITAMIN GUMMIES ORAL) Take 1 capsule by mouth 2 (two) times daily.    oxybutynin (DITROPAN) 5 MG Tab 1/2 tab po bid    pantoprazole (PROTONIX) 40 MG tablet TK 1 T PO  QAM 30 MINUTES BEFORE BREAKFAST    vitamin E 1000 UNIT capsule Take 1,000 Units by mouth once daily.    lisinopril (PRINIVIL,ZESTRIL) 5 MG tablet Take 1 tablet (5 mg total) by mouth once daily.           Clinical Reference Information           Your Vitals Were     BP Pulse Resp Height Weight SpO2    148/72 74 18 5' 2" (1.575 m) 60.6 kg (133 lb 9.6 oz) 98%    BMI                24.44 kg/m2          Blood Pressure          Most Recent Value    BP  (!)  148/72      Allergies as of 2/13/2017     No Known Allergies      Immunizations Administered on Date of Encounter - 2/13/2017     Name Date Dose VIS Date Route    Pneumococcal Polysaccharide - 23 Valent  Incomplete 0.5 mL 4/24/2015 Intramuscular      Orders Placed During Today's Visit      Normal Orders This Visit    Pneumococcal Polysaccharide Vaccine (23 Valent) (SQ/IM)       [facility] Sign-Up     Activating your [facility] account is as easy as 1-2-3!     1) Visit [email or web address], select Sign Up Now, enter this activation code and your date of birth, then select Next.  [email or web address]  Expires: 2/18/2017 12:16 PM      2) Create a username and password to use when you visit [facility] in the future and select a security question in case you lose your password and select Next.    3) Enter your e-mail address and click Sign Up!    Additional Information  If you have questions, please e-mail [email or web address] or call [phone] to talk to our [facility] staff. Remember, [facility] is NOT to be used for urgent needs. For medical emergencies, dial 911.         Language Assistance Services     ATTENTION: Language assistance services are available, free " of charge. Please call [phone].      ATENCIÓN: Si habla español, tiene a hyman disposición servicios gratuitos de asistencia lingüística. Llame al [phone].     CHÚ Ý: N?u b?n nói Ti?ng Vi?t, có các d?ch v? h? tr? ngôn ng? mi?n phí dành cho b?n. G?i s? [phone].         [facility] complies with applicable Federal civil rights laws and does not discriminate on the basis of race, color, national origin, age, disability, or sex.

## 2017-02-13 NOTE — PROGRESS NOTES
Subjective:       Patient ID: Michela Martinez is a 87 y.o. female.    Chief Complaint: Follow-up    HPI Comments: Here for f/u chronic medical issues.  Had hand surgery last week.  States doing well overall.    Hypertension   This is a chronic problem. The current episode started more than 1 year ago. The problem is controlled. Pertinent negatives include no chest pain, palpitations or shortness of breath. Past treatments include ACE inhibitors. The current treatment provides moderate improvement. There are no compliance problems.    Hyperlipidemia   This is a chronic problem. The current episode started more than 1 year ago. Pertinent negatives include no chest pain or shortness of breath. Current antihyperlipidemic treatment includes statins. The current treatment provides moderate improvement of lipids. There are no compliance problems.      Review of Systems   Constitutional: Negative for chills, fatigue and fever.   Respiratory: Negative for cough, chest tightness and shortness of breath.    Cardiovascular: Negative for chest pain, palpitations and leg swelling.   Gastrointestinal: Negative for abdominal distention and abdominal pain.   Endocrine: Negative for cold intolerance and heat intolerance.   Skin: Negative for rash and wound.   Psychiatric/Behavioral: Negative for dysphoric mood. The patient is not nervous/anxious.        Objective:      Physical Exam   Constitutional: She appears well-developed and well-nourished.   HENT:   Head: Normocephalic and atraumatic.   Cardiovascular: Normal rate, regular rhythm and normal heart sounds.    Pulmonary/Chest: Effort normal and breath sounds normal.   Abdominal: Soft. Bowel sounds are normal.   Psychiatric: She has a normal mood and affect.   Nursing note and vitals reviewed.      Assessment:       1. Essential hypertension    2. Dementia without behavioral disturbance, unspecified dementia type    3. PVD (peripheral vascular disease)    4. Orthostatic  hypotension    5. Chronic kidney disease (CKD), stage III (moderate)    6. Hyperlipidemia LDL goal <100    7. Osteopenia    8. Neural foraminal stenosis of lumbar spine    9. DDD (degenerative disc disease), lumbar    10. Spondylolisthesis, unspecified spinal region    11. Lumbar stenosis    12. History of TIA (transient ischemic attack)    13. Synovial cyst of lumbar facet joint    14. Thrombocytopenia    15. Need for pneumococcal vaccination        Plan:       Essential hypertension    Dementia without behavioral disturbance, unspecified dementia type    PVD (peripheral vascular disease)    Orthostatic hypotension    Chronic kidney disease (CKD), stage III (moderate)    Hyperlipidemia LDL goal <100    Osteopenia    Neural foraminal stenosis of lumbar spine    DDD (degenerative disc disease), lumbar    Spondylolisthesis, unspecified spinal region    Lumbar stenosis    History of TIA (transient ischemic attack)    Synovial cyst of lumbar facet joint    Thrombocytopenia    Need for pneumococcal vaccination  -     Pneumococcal Polysaccharide Vaccine (23 Valent) (SQ/IM)    Other orders  -     atorvastatin (LIPITOR) 10 MG tablet; Take 1 tablet (10 mg total) by mouth every evening.  Dispense: 90 tablet; Refill: 3  -     clopidogrel (PLAVIX) 75 mg tablet; Take 1 tablet (75 mg total) by mouth every morning.  Dispense: 90 tablet; Refill: 3  -     lamotrigine (LAMICTAL) 150 MG Tab; Take 1 tablet (150 mg total) by mouth 2 (two) times daily.  Dispense: 180 tablet; Refill: 3  -     Discontinue: lisinopril (PRINIVIL,ZESTRIL) 2.5 MG tablet; Take 2 tablets (5 mg total) by mouth every evening.  Dispense: 180 tablet; Refill: 3  -     memantine (NAMENDA) 10 MG Tab; Take 1 tablet (10 mg total) by mouth 2 (two) times daily. Twice a day  Dispense: 180 tablet; Refill: 3  -     midodrine (PROAMATINE) 2.5 MG Tab; Take 1 tablet (2.5 mg total) by mouth 2 (two) times daily with meals. Take at 9am with morning medications and at 3pm  Dispense:  180 tablet; Refill: 3  -     oxybutynin (DITROPAN) 5 MG Tab; 1/2 tab po bid  Dispense: 90 tablet; Refill: 3  -     pantoprazole (PROTONIX) 40 MG tablet; TK 1 T PO  QAM 30 MINUTES BEFORE BREAKFAST  Dispense: 90 tablet; Refill: 3  -     lisinopril (PRINIVIL,ZESTRIL) 5 MG tablet; Take 1 tablet (5 mg total) by mouth once daily.  Dispense: 90 tablet; Refill: 3      Will monitor chronic medical issues and continue current plan of care.  Monitor home bp and report if >150/90.  Bring home machine for nurse bp check in 2 weeks.  Labs next month as scheduled.  Return in about 3 months (around 5/13/2017), or if symptoms worsen or fail to improve.

## 2017-02-15 ENCOUNTER — OUTPATIENT CASE MANAGEMENT (OUTPATIENT)
Dept: ADMINISTRATIVE | Facility: OTHER | Age: 82
End: 2017-02-15

## 2017-02-15 NOTE — PROGRESS NOTES
2/15/17-RN PRATIBHA called patient's son. Attempt to screen patient for OPCM. No answer. Message left requesting return call. Will attempt to contact at a later date.

## 2017-02-17 ENCOUNTER — OUTPATIENT CASE MANAGEMENT (OUTPATIENT)
Dept: ADMINISTRATIVE | Facility: OTHER | Age: 82
End: 2017-02-17

## 2017-02-17 NOTE — PROGRESS NOTES
2/17/17-RN CM called patient in attempt to screen patient for OPCM. Patient reported that she was doing well. Stated that she has [facility]  coming out 1-2 times weekly to assist with needs. Monitor her BP daily. Recent BP today reported was 119/60. Patient declined need for additional assistance at this time. RN CM will send patient contact information for OOC and OPCM in the event that needs develop. Will close case at this time.

## 2017-03-20 ENCOUNTER — TELEPHONE (OUTPATIENT)
Dept: FAMILY MEDICINE | Facility: CLINIC | Age: 82
End: 2017-03-20

## 2017-03-20 NOTE — TELEPHONE ENCOUNTER
Phoned pt's son, Tod in regards to message below. Scheduled an appt with Yoli Munoz NP for tomorrow, 3/21/17 at 9:20 AM. Tod voiced understanding for appt. CLC

## 2017-03-20 NOTE — TELEPHONE ENCOUNTER
Pt's son, Tod is requesting to have the pt's labs done today with a U/A due to the pt possibly having a UTI. Pt has an appt for tomorrow with Yoli Munoz NP due to increase weakness and disorientation. Please review labs pt is suppose to be having to [facility] and advise on U/A order as Tod states he will bring her in today for labs. Thank you. CLC

## 2017-03-20 NOTE — TELEPHONE ENCOUNTER
----- Message from Mary Macdonald sent at 3/20/2017  8:50 AM CDT -----  Contact: son, sarah Tran called x 3 in 4 days   Disoriented  Wants to discuss seeing Neuro. Or what steps now need to be taken   Call back

## 2017-03-21 ENCOUNTER — LAB VISIT (OUTPATIENT)
Dept: LAB | Facility: HOSPITAL | Age: 82
End: 2017-03-21
Attending: INTERNAL MEDICINE
Payer: MEDICARE

## 2017-03-21 ENCOUNTER — OFFICE VISIT (OUTPATIENT)
Dept: FAMILY MEDICINE | Facility: CLINIC | Age: 82
End: 2017-03-21
Payer: MEDICARE

## 2017-03-21 VITALS
HEIGHT: 62 IN | BODY MASS INDEX: 23.61 KG/M2 | WEIGHT: 128.31 LBS | RESPIRATION RATE: 18 BRPM | HEART RATE: 66 BPM | SYSTOLIC BLOOD PRESSURE: 168 MMHG | OXYGEN SATURATION: 97 % | DIASTOLIC BLOOD PRESSURE: 68 MMHG | TEMPERATURE: 98 F

## 2017-03-21 DIAGNOSIS — R41.0 DISORIENTATION: ICD-10-CM

## 2017-03-21 DIAGNOSIS — I73.9 PVD (PERIPHERAL VASCULAR DISEASE): ICD-10-CM

## 2017-03-21 DIAGNOSIS — R29.6 FALLING EPISODES: ICD-10-CM

## 2017-03-21 DIAGNOSIS — R10.9 RIGHT FLANK DISCOMFORT: ICD-10-CM

## 2017-03-21 DIAGNOSIS — I12.9 BENIGN HYPERTENSIVE RENAL DISEASE WITHOUT RENAL FAILURE: ICD-10-CM

## 2017-03-21 DIAGNOSIS — D69.6 THROMBOCYTOPENIA: ICD-10-CM

## 2017-03-21 DIAGNOSIS — N18.30 CHRONIC KIDNEY DISEASE (CKD), STAGE III (MODERATE): Chronic | ICD-10-CM

## 2017-03-21 DIAGNOSIS — N18.30 CHRONIC KIDNEY DISEASE (CKD), STAGE III (MODERATE): ICD-10-CM

## 2017-03-21 DIAGNOSIS — F03.91 DEMENTIA WITH BEHAVIORAL DISTURBANCE, UNSPECIFIED DEMENTIA TYPE: ICD-10-CM

## 2017-03-21 DIAGNOSIS — R41.0 DISORIENTATION: Primary | ICD-10-CM

## 2017-03-21 LAB
ALBUMIN SERPL BCP-MCNC: 3.6 G/DL
ALBUMIN SERPL BCP-MCNC: 3.8 G/DL
ALP SERPL-CCNC: 106 U/L
ALT SERPL W/O P-5'-P-CCNC: 24 U/L
ANION GAP SERPL CALC-SCNC: 10 MMOL/L
ANION GAP SERPL CALC-SCNC: 11 MMOL/L
AST SERPL-CCNC: 18 U/L
BASOPHILS # BLD AUTO: 0 K/UL
BASOPHILS NFR BLD: 0 %
BILIRUB SERPL-MCNC: 0.4 MG/DL
BILIRUB SERPL-MCNC: ABNORMAL MG/DL
BLOOD URINE, POC: ABNORMAL
BUN SERPL-MCNC: 32 MG/DL
BUN SERPL-MCNC: 33 MG/DL
CALCIUM SERPL-MCNC: 10.2 MG/DL
CALCIUM SERPL-MCNC: 10.3 MG/DL
CHLORIDE SERPL-SCNC: 103 MMOL/L
CHLORIDE SERPL-SCNC: 104 MMOL/L
CO2 SERPL-SCNC: 29 MMOL/L
CO2 SERPL-SCNC: 30 MMOL/L
COLOR, POC UA: ABNORMAL
CREAT SERPL-MCNC: 1.9 MG/DL
CREAT SERPL-MCNC: 1.9 MG/DL
DIFFERENTIAL METHOD: ABNORMAL
EOSINOPHIL # BLD AUTO: 0.2 K/UL
EOSINOPHIL NFR BLD: 2.4 %
ERYTHROCYTE [DISTWIDTH] IN BLOOD BY AUTOMATED COUNT: 12.8 %
EST. GFR  (AFRICAN AMERICAN): 26.9 ML/MIN/1.73 M^2
EST. GFR  (AFRICAN AMERICAN): 27 ML/MIN/1.73 M^2
EST. GFR  (NON AFRICAN AMERICAN): 23 ML/MIN/1.73 M^2
EST. GFR  (NON AFRICAN AMERICAN): 23.4 ML/MIN/1.73 M^2
GLUCOSE SERPL-MCNC: 87 MG/DL
GLUCOSE SERPL-MCNC: 92 MG/DL
GLUCOSE UR QL STRIP: ABNORMAL
HCT VFR BLD AUTO: 36.2 %
HGB BLD-MCNC: 12.1 G/DL
KETONES UR QL STRIP: ABNORMAL
LEUKOCYTE ESTERASE URINE, POC: ABNORMAL
LYMPHOCYTES # BLD AUTO: 1.3 K/UL
LYMPHOCYTES NFR BLD: 18.1 %
MCH RBC QN AUTO: 33.1 PG
MCHC RBC AUTO-ENTMCNC: 33.4 %
MCV RBC AUTO: 99 FL
MONOCYTES # BLD AUTO: 0.7 K/UL
MONOCYTES NFR BLD: 9.7 %
NEUTROPHILS # BLD AUTO: 5 K/UL
NEUTROPHILS NFR BLD: 69.8 %
NITRITE, POC UA: ABNORMAL
PH, POC UA: 5
PHOSPHATE SERPL-MCNC: 3.3 MG/DL
PLATELET # BLD AUTO: 128 K/UL
PMV BLD AUTO: 12.4 FL
POTASSIUM SERPL-SCNC: 4.3 MMOL/L
POTASSIUM SERPL-SCNC: 4.3 MMOL/L
PROT SERPL-MCNC: 6.3 G/DL
PROTEIN, POC: ABNORMAL
PTH-INTACT SERPL-MCNC: 51 PG/ML
RBC # BLD AUTO: 3.66 M/UL
SODIUM SERPL-SCNC: 143 MMOL/L
SODIUM SERPL-SCNC: 144 MMOL/L
SPECIFIC GRAVITY, POC UA: 1015
UROBILINOGEN, POC UA: ABNORMAL
WBC # BLD AUTO: 7.13 K/UL

## 2017-03-21 PROCEDURE — 99999 PR PBB SHADOW E&M-EST. PATIENT-LVL V: CPT | Mod: PBBFAC,,, | Performed by: NURSE PRACTITIONER

## 2017-03-21 PROCEDURE — 83970 ASSAY OF PARATHORMONE: CPT

## 2017-03-21 PROCEDURE — 84100 ASSAY OF PHOSPHORUS: CPT

## 2017-03-21 PROCEDURE — 36415 COLL VENOUS BLD VENIPUNCTURE: CPT | Mod: PO

## 2017-03-21 PROCEDURE — 80053 COMPREHEN METABOLIC PANEL: CPT | Mod: PO

## 2017-03-21 PROCEDURE — 99214 OFFICE O/P EST MOD 30 MIN: CPT | Mod: S$PBB,,, | Performed by: NURSE PRACTITIONER

## 2017-03-21 PROCEDURE — 85025 COMPLETE CBC W/AUTO DIFF WBC: CPT | Mod: PO

## 2017-03-21 RX ORDER — HYDROCODONE BITARTRATE AND ACETAMINOPHEN 5; 325 MG/1; MG/1
TABLET ORAL
Refills: 0 | COMMUNITY
Start: 2017-02-13 | End: 2017-05-16

## 2017-03-21 NOTE — PROGRESS NOTES
Subjective:       Patient ID: Michela Martinez is a 87 y.o. female.    Chief Complaint: disoriented  She was last seen in primary care by Dr. Liang on 02/13/2017. This is her first time seeing me in the clinic. She is brought in by her son. She currently resides in independent living at the [place].  HPI   She is here stating she has had about 4-5 falls within the last week. Son states she cannot follow commands and is becoming more disoriented over past three days and on yesterday she could barely get out of bed.   Vitals:    03/21/17 0938   BP: (!) 168/68   Pulse: 66   Resp: 18   Temp: 98.1 °F (36.7 °C)     BP Readings from Last 3 Encounters:   03/21/17 (!) 168/68   02/13/17 (!) 148/72   02/04/17 (!) 188/73     Wt Readings from Last 1 Encounters:   03/21/17 0938 58.2 kg (128 lb 4.9 oz)   01/17/2017 her weight was 131  09/15/016 weight was 132    CMP  Sodium   Date Value Ref Range Status   03/21/2017 144 136 - 145 mmol/L Final     Potassium   Date Value Ref Range Status   03/21/2017 4.3 3.5 - 5.1 mmol/L Final     Chloride   Date Value Ref Range Status   03/21/2017 103 95 - 110 mmol/L Final     CO2   Date Value Ref Range Status   03/21/2017 30 (H) 23 - 29 mmol/L Final     Glucose   Date Value Ref Range Status   03/21/2017 92 70 - 110 mg/dL Final     BUN, Bld   Date Value Ref Range Status   03/21/2017 33 (H) 8 - 23 mg/dL Final     Creatinine   Date Value Ref Range Status   03/21/2017 1.9 (H) 0.5 - 1.4 mg/dL Final     Calcium   Date Value Ref Range Status   03/21/2017 10.3 8.7 - 10.5 mg/dL Final     Total Protein   Date Value Ref Range Status   03/21/2017 6.3 6.0 - 8.4 g/dL Final     Albumin   Date Value Ref Range Status   03/21/2017 3.8 3.5 - 5.2 g/dL Final     Total Bilirubin   Date Value Ref Range Status   03/21/2017 0.4 0.1 - 1.0 mg/dL Final     Comment:     For infants and newborns, interpretation of results should be based  on gestational age, weight and in agreement with clinical  observations.  Premature  Infant recommended reference ranges:  Up to 24 hours.............<8.0 mg/dL  Up to 48 hours............<12.0 mg/dL  3-5 days..................<15.0 mg/dL  6-29 days.................<15.0 mg/dL       Alkaline Phosphatase   Date Value Ref Range Status   03/21/2017 106 55 - 135 U/L Final     AST ([place])   Date Value Ref Range Status   01/17/2016 36 14 - 36 U/L Final     AST   Date Value Ref Range Status   03/21/2017 18 10 - 40 U/L Final     ALT   Date Value Ref Range Status   03/21/2017 24 10 - 44 U/L Final     Anion Gap   Date Value Ref Range Status   03/21/2017 11 8 - 16 mmol/L Final     eGFR if    Date Value Ref Range Status   03/21/2017 27 (A) >60 mL/min/1.73 m^2 Final     eGFR if non    Date Value Ref Range Status   03/21/2017 23 (A) >60 mL/min/1.73 m^2 Final     Comment:     Calculation used to obtain the estimated glomerular filtration  rate (eGFR) is the CKD-EPI equation. Since race is unknown   in our information system, the eGFR values for   -American and Non--American patients are given   for each creatinine result.       Review of Systems    Her son states she had been seeing neurology on the [place] and he [place].  He states she can go from being cognitive to being disoriented within minutes  Her last CT and MRI of head were in December 2017.  Her sons stated she did have an xray on he hip by her ortho less than 1 month ago and was referred to PT and has had one session  Son states she had [facility] in December after a fall and colitis  Objective:      Physical Exam   Constitutional: She is oriented to person, place, and time.   HENT:   Head: Normocephalic and atraumatic.   Right Ear: External ear normal.   Left Ear: External ear normal.   Nose: Nose normal.   Mouth/Throat: Oropharynx is clear and moist.   Eyes: Lids are normal.   Neck: Normal range of motion. Neck  supple.   Cardiovascular: Normal rate and regular rhythm.    Pulmonary/Chest: Effort normal and breath sounds normal.   Abdominal: Soft. There is no tenderness.   Musculoskeletal: Normal range of motion.        Back:    Lymphadenopathy:        Head (right side): No submental, no submandibular, no tonsillar, no preauricular, no posterior auricular and no occipital adenopathy present.        Head (left side): No submental, no submandibular, no tonsillar, no preauricular, no posterior auricular and no occipital adenopathy present.   Neurological: She is alert and oriented to person, place, and time.   Skin: Skin is warm, dry and intact.   Psychiatric: She has a normal mood and affect. Her speech is normal and behavior is normal. Judgment and thought content normal. Cognition and memory are impaired.   Son states having periods of disorientation which are sporadic and noticeably increasing over past few days   Nursing note and vitals reviewed.      Assessment & Plan:       Disorientation  -     Ambulatory referral to Neurology  -     CBC auto differential; Future; Expected date: 3/21/17  -     Comprehensive metabolic panel; Future; Expected date: 3/21/17  -     POCT urinalysis, dipstick or tablet reag    Falling episodes  -     CBC auto differential; Future; Expected date: 3/21/17  -     Comprehensive metabolic panel; Future; Expected date: 3/21/17    Thrombocytopenia    Dementia with behavioral disturbance, unspecified dementia type    Right flank discomfort  -     POCT urinalysis, dipstick or tablet reag    Chronic kidney disease (CKD), stage III (moderate)    Other orders  -     Ambulatory referral to Home Health    Encouraged to increase fluids      No Follow-up on file.

## 2017-03-21 NOTE — MR AVS SNAPSHOT
[facility]  [address]  [place] [address]  Phone: [phone]  Fax: [phone]                  Michela Martinez   3/21/2017 9:20 AM   Office Visit    Description:  Female : 1929   Provider:  Yoli Munoz NP   Department:  [facility]           Reason for Visit     disoriented           Diagnoses this Visit        Comments    Disorientation    -  Primary     Falling episodes         Thrombocytopenia         Dementia with behavioral disturbance, unspecified dementia type         Right flank discomfort         Chronic kidney disease (CKD), stage III (moderate)                To Do List           Future Appointments        Provider Department Dept Phone    2017 10:40 AM MAGGIE Lind MD [facility] [phone]      Goals (5 Years of Data)     None      [facility]     [facility] On [facility] Nurse Care Line -  Assistance  Registered nurses in the [facility] provide clinical advisement, health education, appointment booking, and other advisory services.  Call for this free service at [phone].             Medications           Message regarding Medications     Verify the changes and/or additions to your medication regime listed below are the same as discussed with your clinician today.  If any of these changes or additions are incorrect, please notify your healthcare provider.             Verify that the below list of medications is an accurate representation of the medications you are currently taking.  If none reported, the list may be blank. If incorrect, please contact your healthcare provider. Carry this list with you in case of emergency.           Current Medications     acetaminophen (TYLENOL) 325 MG tablet Take 325 mg by mouth every 6 (six) hours as needed for Pain.    ascorbic acid, vitamin C, (VITAMIN C) 500 MG tablet Take 500 mg by mouth once daily.    atorvastatin (LIPITOR) 10 MG tablet Take 1 tablet  "(10 mg total) by mouth every evening.    calcium-vitamin D3 (CALCIUM 500 + D) 500 mg(1,250mg) -200 unit per tablet Take 1 tablet by mouth 2 (two) times daily with meals.    clopidogrel (PLAVIX) 75 mg tablet Take 1 tablet (75 mg total) by mouth every morning.    cyanocobalamin (VITAMIN B-12) 500 MCG tablet Take 500 mcg by mouth every evening.     ferrous sulfate 325 (65 FE) MG EC tablet Take 325 mg by mouth once daily.    hydrocodone-acetaminophen 5-325mg (NORCO) 5-325 mg per tablet TK 1 T PO  Q 4 TO 6 H PRN P    lamotrigine (LAMICTAL) 150 MG Tab Take 1 tablet (150 mg total) by mouth 2 (two) times daily.    lisinopril (PRINIVIL,ZESTRIL) 5 MG tablet Take 1 tablet (5 mg total) by mouth once daily.    memantine (NAMENDA) 10 MG Tab Take 1 tablet (10 mg total) by mouth 2 (two) times daily. Twice a day    midodrine (PROAMATINE) 2.5 MG Tab Take 1 tablet (2.5 mg total) by mouth 2 (two) times daily with meals. Take at 9am with morning medications and at 3pm    MULTIVIT-MINERALS/FOLIC ACID (WOMEN'S MULTIVITAMIN GUMMIES ORAL) Take 1 capsule by mouth 2 (two) times daily.    oxybutynin (DITROPAN) 5 MG Tab 1/2 tab po bid    pantoprazole (PROTONIX) 40 MG tablet TK 1 T PO  QAM 30 MINUTES BEFORE BREAKFAST    vitamin E 1000 UNIT capsule Take 1,000 Units by mouth once daily.           Clinical Reference Information           Your Vitals Were     BP Pulse Temp Resp Height Weight    168/68 66 98.1 °F (36.7 °C) (Oral) 18 5' 2" (1.575 m) 58.2 kg (128 lb 4.9 oz)    SpO2 BMI             97% 23.47 kg/m2         Blood Pressure          Most Recent Value    BP  (!)  168/68      Allergies as of 3/21/2017     No Known Allergies      Immunizations Administered on Date of Encounter - 3/21/2017     None      Orders Placed During Today's Visit      Normal Orders This Visit    Ambulatory referral to Neurology     POCT urinalysis, dipstick or tablet reag     Future Labs/Procedures Expected by Expires    CBC auto differential  3/21/2017 5/20/2018    " Comprehensive metabolic panel  3/21/2017 6/19/2017      [facility] Sign-Up     Activating your [facility] account is as easy as 1-2-3!     1) Visit [email or web address], select Sign Up Now, enter this activation code and your date of birth, then select Next.  [email or web address]  Expires: 5/5/2017 12:04 PM      2) Create a username and password to use when you visit [facility] in the future and select a security question in case you lose your password and select Next.    3) Enter your e-mail address and click Sign Up!    Additional Information  If you have questions, please e-mail [email or web address] or call [phone] to talk to our [facility] staff. Remember, [facility] is NOT to be used for urgent needs. For medical emergencies, dial 911.         Language Assistance Services     ATTENTION: Language assistance services are available, free of charge. Please call [phone].      ATENCIÓN: Si habla español, tiene a hyman disposición servicios gratuitos de asistencia lingüística. Llame al [phone].     PADDY Ý: N?u b?n nói Ti?ng Vi?t, có các d?ch v? h? tr? ngôn ng? mi?n phí dành cho b?n. G?i s? [phone].         [facility] complies with applicable Federal civil rights laws and does not discriminate on the basis of race, color, national origin, age, disability, or sex.

## 2017-03-29 ENCOUNTER — TELEPHONE (OUTPATIENT)
Dept: FAMILY MEDICINE | Facility: CLINIC | Age: 82
End: 2017-03-29

## 2017-03-29 NOTE — TELEPHONE ENCOUNTER
----- Message from Kat Olivas sent at 3/28/2017  2:52 PM CDT -----  Elba with [facility] is calling to check status of order for home health aid 02/05/17  faxed on 02/07/17 and twice since, she has not received back, contact Nazia at [phone].    Thank you

## 2017-03-29 NOTE — TELEPHONE ENCOUNTER
Spoke to Cyndee. Informed her the paperwork requested is being faxed again to her office. She states she will pass the message for Nazia to look for paperwork.

## 2017-04-04 ENCOUNTER — OFFICE VISIT (OUTPATIENT)
Dept: NEUROLOGY | Facility: CLINIC | Age: 82
End: 2017-04-04
Payer: MEDICARE

## 2017-04-04 VITALS
TEMPERATURE: 99 F | WEIGHT: 128.5 LBS | SYSTOLIC BLOOD PRESSURE: 196 MMHG | HEART RATE: 69 BPM | BODY MASS INDEX: 23.65 KG/M2 | DIASTOLIC BLOOD PRESSURE: 80 MMHG | RESPIRATION RATE: 18 BRPM | HEIGHT: 62 IN

## 2017-04-04 DIAGNOSIS — F01.518 VASCULAR DEMENTIA WITH BEHAVIOR DISTURBANCE: ICD-10-CM

## 2017-04-04 DIAGNOSIS — R41.0 EPISODIC CONFUSION: ICD-10-CM

## 2017-04-04 DIAGNOSIS — F03.91 DEMENTIA WITH BEHAVIORAL DISTURBANCE, UNSPECIFIED DEMENTIA TYPE: Primary | ICD-10-CM

## 2017-04-04 PROCEDURE — 99999 PR PBB SHADOW E&M-EST. PATIENT-LVL IV: CPT | Mod: PBBFAC,,, | Performed by: NURSE PRACTITIONER

## 2017-04-04 PROCEDURE — 99214 OFFICE O/P EST MOD 30 MIN: CPT | Mod: PBBFAC,PN | Performed by: NURSE PRACTITIONER

## 2017-04-04 PROCEDURE — 99214 OFFICE O/P EST MOD 30 MIN: CPT | Mod: S$PBB,,, | Performed by: NURSE PRACTITIONER

## 2017-04-04 RX ORDER — DONEPEZIL HYDROCHLORIDE 5 MG/1
5 TABLET, FILM COATED ORAL DAILY
Qty: 30 TABLET | Refills: 3
Start: 2017-04-04 | End: 2017-05-16 | Stop reason: SDUPTHER

## 2017-04-04 RX ORDER — DONEPEZIL HYDROCHLORIDE 5 MG/1
5 TABLET, FILM COATED ORAL NIGHTLY
Qty: 30 TABLET | Refills: 2 | Status: SHIPPED | OUTPATIENT
Start: 2017-04-04 | End: 2017-06-29 | Stop reason: SDUPTHER

## 2017-04-04 NOTE — PROGRESS NOTES
Subjective:      Michela Martinez is a 87 y.o. right handed female self-referred for evaluation and treatment of cognitive problems. She is accompanied by son. Primary caregiver is adult caretaker.  The family and the patient identify problems with changes in short term memory, day/night changes, getting disoriented outside of familiar environment and intermittent confusion. Family and patient report problems with vivid dreams. Family and patient are concerned about  Decline in cognitive function over last 3 months, however, they are not concerned about medication errors, wandering, driving, cooking and inability to maintain adequate nutrition because patient live assisted living with [facility].  Medication administration: medication managed by Assisted Living Staff.  Has had several falls over last 6 months.  Presently participating in OP PT and using walker.  Patient had 2 hand surgeries over the last 3 months.  Decline in cognitive function probably related to these episodes. Participates in multiple social activities- puzzles, card games.  Staff has noticed cognitive decline.  Patient has been followed be Dr Carranza for dementia management.  Has been taking Namenda 10 mg twice a day for several years with no side effects.  Mother and sister had AD.  Patient is very worried about having AD.  Functional Assessment:   Activities of Daily Living (ADLs):    She is independent in the following: ambulation, feeding, continence and toileting  Requires assistance with the following: bathing and hygiene and dressing  Instrumental Activities of Daily Living (IADLs):    Requires assistance with the following: son has POA and manages all of her affairs  Outside reports reviewed: ER records, imaging reports: MRI, lab reports and office notes.    The following portions of the patient's history were reviewed and updated as appropriate: past social history, past surgical history and problem list.    Review of  "Systems  A comprehensive review of systems was negative except for: Neurological: positive for gait problems, memory problems and dementia      Objective:      BP (!) 196/80 (BP Location: Left arm, Patient Position: Sitting, BP Method: Automatic)  Pulse 69  Temp 98.7 °F (37.1 °C) (Oral)   Resp 18  Ht 5' 2" (1.575 m)  Wt 58.3 kg (128 lb 8.5 oz)  BMI 23.51 kg/m2  General appearance: alert, appears stated age and cooperative  Neurologic: Mental status: Alert, oriented, thought content appropriate, MOCA was administered - Overall Score was 17/30  Normal >26  (see scanned test)  VS/Executive function-1/5; attention-4/6; verbal fluency - 5 words; abstraction-1/2; immediate recall 2/5 after 2 trials; delayed recall- 0 of 5 words (1 with cueing) at 5 mins; 0 (2 with cueing) at 10 mins.    Imaging  Reviewed CT head  12/30/16  1.  No acute intracranial abnormality is visualized.  2.  Generalized involutional changes are seen.  There is prominence of the ventricular system which appears disproportionate to the sulci.  Therefore, hydrocephalus including normal pressure hydrocephalus is not excluded.  Please correlate clinically.  3.  There is advanced periventricular and deep white matter low attenuation which is nonspecific, but is most commonly associated with advanced chronic microvascular ischemic changes.    12/30/16  Carotid US  No significant stenosis of the right internal carotid.  50-69 percent stenosis of the left internal carotid.  Antegrade flow in both vertebrals.    Lab Review     Ref. Range 12/31/2016 03:45   TSH Latest Ref Range: 0.400 - 4.000 uIU/mL 2.300       01/23/17  EEG  IMPRESSION: Normal EEG.   CLINICAL CORRELATION: The patient is an 87-year-old female who has had multiple  episodes of passing out and there is question whether she could be experiencing  seizures. This tracing, however, shows no evidence for focal cortical   dysfunction nor an epileptic process.     Assessment:      Dementia, " moderate      Plan:      Implications of diagnosis explained at length with the patient and caregiver.  All questions answered fully.  MRI brain -Reviewed films with patient and son  CT brain.- Reviewed films with patient and son  EEG -  No seizure activity noted  Donepezil 5 mg daily.  Encouraged to continue OP PT  Encouraged to continue cognitive enhancement and social activities.  Follow up in 4 months.

## 2017-04-10 RX ORDER — MEMANTINE HYDROCHLORIDE 10 MG/1
TABLET ORAL
Qty: 180 TABLET | Refills: 3 | Status: SHIPPED | OUTPATIENT
Start: 2017-04-10

## 2017-04-11 ENCOUNTER — TELEPHONE (OUTPATIENT)
Dept: FAMILY MEDICINE | Facility: CLINIC | Age: 82
End: 2017-04-11

## 2017-04-11 NOTE — TELEPHONE ENCOUNTER
----- Message from Adela Golden sent at 4/10/2017  9:56 AM CDT -----  Contact: Neelima/ Home Health  Neelima called to speak with the nurse regarding home health orders. She can be contacted at [phone].    Thanks,  Adela

## 2017-04-11 NOTE — TELEPHONE ENCOUNTER
Attempted to contact Nazia from [facility]. Was forwarded to [facility]. Will call back.     Spoke to Nazia from [facility].  She states she is still waiting on paperwork for billing to be faxed back to her. She states she resent the paperwork yesterday.

## 2017-05-16 ENCOUNTER — OFFICE VISIT (OUTPATIENT)
Dept: FAMILY MEDICINE | Facility: CLINIC | Age: 82
End: 2017-05-16
Payer: MEDICARE

## 2017-05-16 VITALS
HEART RATE: 76 BPM | BODY MASS INDEX: 23.57 KG/M2 | DIASTOLIC BLOOD PRESSURE: 84 MMHG | SYSTOLIC BLOOD PRESSURE: 132 MMHG | WEIGHT: 128.06 LBS | HEIGHT: 62 IN | RESPIRATION RATE: 18 BRPM

## 2017-05-16 DIAGNOSIS — E78.5 HYPERLIPIDEMIA LDL GOAL <100: Primary | ICD-10-CM

## 2017-05-16 DIAGNOSIS — I10 ESSENTIAL HYPERTENSION: Chronic | ICD-10-CM

## 2017-05-16 DIAGNOSIS — N18.30 CHRONIC KIDNEY DISEASE (CKD), STAGE III (MODERATE): Chronic | ICD-10-CM

## 2017-05-16 PROCEDURE — 99213 OFFICE O/P EST LOW 20 MIN: CPT | Mod: PBBFAC,PO | Performed by: FAMILY MEDICINE

## 2017-05-16 PROCEDURE — 99999 PR PBB SHADOW E&M-EST. PATIENT-LVL III: CPT | Mod: PBBFAC,,, | Performed by: FAMILY MEDICINE

## 2017-05-16 PROCEDURE — 99214 OFFICE O/P EST MOD 30 MIN: CPT | Mod: S$PBB,,, | Performed by: FAMILY MEDICINE

## 2017-05-16 NOTE — MR AVS SNAPSHOT
[facility]  [address]  [place] [address]  Phone: [phone]  Fax: [phone]                  Michela Martinez   2017 10:40 AM   Office Visit    Description:  Female : 1929   Provider:  MAGGIE Lind MD   Department:  [facility]           Reason for Visit     Hypertension           Diagnoses this Visit        Comments    Hyperlipidemia LDL goal <100    -  Primary     Chronic kidney disease (CKD), stage III (moderate)         Essential hypertension                To Do List           Future Appointments        Provider Department Dept Phone    8/15/2017 1:40 PM MAGGIE Lind MD [facility] [phone]      Goals (5 Years of Data)     None      Follow-Up and Disposition     Return in about 3 months (around 2017), or if symptoms worsen or fail to improve.    Follow-up and Disposition History      [facility] On Call     [facility] Nurse Care Line -  Assistance  Unless otherwise directed by your provider, please contact [facility], our nurse care line that is available for  assistance.     Registered nurses in the [facility] provide: appointment scheduling, clinical advisement, health education, and other advisory services.  Call: [phone] (toll free)               Medications           Message regarding Medications     Verify the changes and/or additions to your medication regime listed below are the same as discussed with your clinician today.  If any of these changes or additions are incorrect, please notify your healthcare provider.        STOP taking these medications     hydrocodone-acetaminophen 5-325mg (NORCO) 5-325 mg per tablet TK 1 T PO  Q 4 TO 6 H PRN P           Verify that the below list of medications is an accurate representation of the medications you are currently taking.  If none reported, the list may be blank. If incorrect, please contact your healthcare provider.  "Carry this list with you in case of emergency.           Current Medications     acetaminophen (TYLENOL) 325 MG tablet Take 325 mg by mouth every 6 (six) hours as needed for Pain.    ascorbic acid, vitamin C, (VITAMIN C) 500 MG tablet Take 500 mg by mouth once daily.    atorvastatin (LIPITOR) 10 MG tablet Take 1 tablet (10 mg total) by mouth every evening.    calcium-vitamin D3 (CALCIUM 500 + D) 500 mg(1,250mg) -200 unit per tablet Take 1 tablet by mouth 2 (two) times daily with meals.    clopidogrel (PLAVIX) 75 mg tablet Take 1 tablet (75 mg total) by mouth every morning.    cyanocobalamin (VITAMIN B-12) 500 MCG tablet Take 500 mcg by mouth every evening.     donepezil (ARICEPT) 5 MG tablet Take 1 tablet (5 mg total) by mouth every evening.    ferrous sulfate 325 (65 FE) MG EC tablet Take 325 mg by mouth once daily.    lamotrigine (LAMICTAL) 150 MG Tab Take 1 tablet (150 mg total) by mouth 2 (two) times daily.    lisinopril (PRINIVIL,ZESTRIL) 5 MG tablet Take 1 tablet (5 mg total) by mouth once daily.    memantine (NAMENDA) 10 MG Tab TAKE 1 TABLET BY MOUTH TWICE DAILY    midodrine (PROAMATINE) 2.5 MG Tab Take 1 tablet (2.5 mg total) by mouth 2 (two) times daily with meals. Take at 9am with morning medications and at 3pm    MULTIVIT-MINERALS/FOLIC ACID (WOMEN'S MULTIVITAMIN GUMMIES ORAL) Take 1 capsule by mouth 2 (two) times daily.    oxybutynin (DITROPAN) 5 MG Tab 1/2 tab po bid    pantoprazole (PROTONIX) 40 MG tablet TK 1 T PO  QAM 30 MINUTES BEFORE BREAKFAST    vitamin E 1000 UNIT capsule Take 1,000 Units by mouth once daily.           Clinical Reference Information           Your Vitals Were     BP Pulse Resp Height Weight BMI    132/84 (BP Location: Left arm, Patient Position: Sitting) 76 18 5' 2" (1.575 m) 58.1 kg (128 lb 1.4 oz) 23.43 kg/m2      Blood Pressure          Most Recent Value    BP  132/84      Allergies as of 5/16/2017     No Known Allergies      Immunizations Administered on Date of Encounter - " 5/16/2017     None      [facility] Sign-Up     Activating your [facility] account is as easy as 1-2-3!     1) Visit [email or web address], select Sign Up Now, enter this activation code and your date of birth, then select Next.  [email or web address]  Expires: 6/30/2017 11:18 AM      2) Create a username and password to use when you visit [facility] in the future and select a security question in case you lose your password and select Next.    3) Enter your e-mail address and click Sign Up!    Additional Information  If you have questions, please e-mail [email or web address] or call [phone] to talk to our [facility] staff. Remember, [facility] is NOT to be used for urgent needs. For medical emergencies, dial 911.         Language Assistance Services     ATTENTION: Language assistance services are available, free of charge. Please call [phone].      ATENCIÓN: Si erenla yolis, tiene a hyman disposición servicios gratuitos de asistencia lingüística. Llame al [phone].     PADDY Ý: N?u b?n nói Ti?ng Vi?t, có các d?ch v? h? tr? ngôn ng? mi?n phí dành cho b?n. G?i s? [phone].         [facility] complies with applicable Federal civil rights laws and does not discriminate on the basis of race, color, national origin, age, disability, or sex.

## 2017-05-16 NOTE — PROGRESS NOTES
Subjective:       Patient ID: Michela Martinez is a 87 y.o. female.    Chief Complaint: Hypertension (Patient has no complaints today. )    HPI Comments: Here for f/u chronic medical issues.  States doing better since last visit.      Hypertension   This is a chronic problem. The current episode started more than 1 year ago. The problem is controlled. Pertinent negatives include no chest pain, palpitations or shortness of breath. Past treatments include ACE inhibitors.   Hyperlipidemia   This is a chronic problem. The current episode started more than 1 year ago. The problem is controlled. Pertinent negatives include no chest pain or shortness of breath. Current antihyperlipidemic treatment includes statins.     Review of Systems   Constitutional: Negative for chills, fatigue and fever.   Respiratory: Negative for cough, chest tightness and shortness of breath.    Cardiovascular: Negative for chest pain, palpitations and leg swelling.   Gastrointestinal: Negative for abdominal distention and abdominal pain.   Endocrine: Negative for cold intolerance and heat intolerance.   Skin: Negative for rash and wound.   Psychiatric/Behavioral: Negative for confusion, dysphoric mood, sleep disturbance and suicidal ideas. The patient is not nervous/anxious.        Objective:      Physical Exam   Constitutional: She appears well-developed and well-nourished.   Cardiovascular: Normal rate, regular rhythm and normal heart sounds.    Pulmonary/Chest: Effort normal and breath sounds normal.   Abdominal: Soft. Bowel sounds are normal.   Psychiatric: She has a normal mood and affect.   Nursing note and vitals reviewed.      Assessment:       1. Hyperlipidemia LDL goal <100    2. Chronic kidney disease (CKD), stage III (moderate)    3. Essential hypertension        Plan:       Hyperlipidemia LDL goal <100    Chronic kidney disease (CKD), stage III (moderate)    Essential hypertension      Will monitor chronic medical issues and continue  current plan of care.      Return in about 3 months (around 8/16/2017), or if symptoms worsen or fail to improve.

## 2017-05-26 RX ORDER — PANTOPRAZOLE SODIUM 40 MG/1
TABLET, DELAYED RELEASE ORAL
Qty: 90 TABLET | Refills: 1 | Status: SHIPPED | OUTPATIENT
Start: 2017-05-26 | End: 2017-11-28 | Stop reason: CLARIF

## 2017-06-09 ENCOUNTER — TELEPHONE (OUTPATIENT)
Dept: FAMILY MEDICINE | Facility: CLINIC | Age: 82
End: 2017-06-09

## 2017-06-22 ENCOUNTER — TELEPHONE (OUTPATIENT)
Dept: FAMILY MEDICINE | Facility: CLINIC | Age: 82
End: 2017-06-22

## 2017-06-30 RX ORDER — DONEPEZIL HYDROCHLORIDE 5 MG/1
TABLET, FILM COATED ORAL
Qty: 30 TABLET | Refills: 2 | Status: SHIPPED | OUTPATIENT
Start: 2017-06-30 | End: 2017-09-15 | Stop reason: SDUPTHER

## 2017-09-14 NOTE — TELEPHONE ENCOUNTER
Pt. Son Tod came by the clinic today. Stated that he and his siblings have noticed great improvement in his mother since starting the donzepril.  He is requesting that the prescription be changed to a 90 tablets rather than 30 tablets so he can refill every 3 months.   Pt. Has a week left of medication and needs a refill sent to [facility].

## 2017-09-15 RX ORDER — DONEPEZIL HYDROCHLORIDE 5 MG/1
5 TABLET, FILM COATED ORAL NIGHTLY
Qty: 90 TABLET | Refills: 2 | Status: SHIPPED | OUTPATIENT
Start: 2017-09-15 | End: 2017-10-19

## 2017-09-15 RX ORDER — DONEPEZIL HYDROCHLORIDE 5 MG/1
TABLET, FILM COATED ORAL
Qty: 30 TABLET | Refills: 0 | Status: SHIPPED | OUTPATIENT
Start: 2017-09-15 | End: 2017-10-19

## 2017-10-19 ENCOUNTER — OFFICE VISIT (OUTPATIENT)
Dept: NEUROLOGY | Facility: CLINIC | Age: 82
End: 2017-10-19
Payer: MEDICARE

## 2017-10-19 VITALS
HEART RATE: 66 BPM | DIASTOLIC BLOOD PRESSURE: 76 MMHG | BODY MASS INDEX: 24.68 KG/M2 | RESPIRATION RATE: 20 BRPM | WEIGHT: 134.13 LBS | HEIGHT: 62 IN | SYSTOLIC BLOOD PRESSURE: 140 MMHG

## 2017-10-19 DIAGNOSIS — F03.91 DEMENTIA WITH BEHAVIORAL DISTURBANCE, UNSPECIFIED DEMENTIA TYPE: Primary | ICD-10-CM

## 2017-10-19 DIAGNOSIS — R41.0 EPISODIC CONFUSION: ICD-10-CM

## 2017-10-19 PROCEDURE — 99214 OFFICE O/P EST MOD 30 MIN: CPT | Mod: PBBFAC,PN | Performed by: NURSE PRACTITIONER

## 2017-10-19 PROCEDURE — 99999 PR PBB SHADOW E&M-EST. PATIENT-LVL IV: CPT | Mod: PBBFAC,,, | Performed by: NURSE PRACTITIONER

## 2017-10-19 PROCEDURE — 99214 OFFICE O/P EST MOD 30 MIN: CPT | Mod: S$PBB,,, | Performed by: NURSE PRACTITIONER

## 2017-10-19 RX ORDER — DONEPEZIL HYDROCHLORIDE 10 MG/1
10 TABLET, FILM COATED ORAL NIGHTLY
Qty: 30 TABLET | Refills: 11 | Status: SHIPPED | OUTPATIENT
Start: 2017-10-19 | End: 2017-10-23 | Stop reason: SDUPTHER

## 2017-10-19 RX ORDER — LISINOPRIL 2.5 MG/1
1 TABLET ORAL DAILY
COMMUNITY
Start: 2017-09-19 | End: 2017-11-08

## 2017-10-19 NOTE — PATIENT INSTRUCTIONS
Increase Donepezil (Aricept) to 10 mg daily.  If you notice any adverse side effects, (nausea, diarrhea, vivid dreaming) stop medication and notify Rosa BRIAN    Our records indicate that your mother is taking Lisinopril 5 mg once a day for hypertension.  The prescription was e [facility] by Dr Lind to [facility].  The medication list from you eports that you are giving her Lisinopril 2.5 mg (2 tablets) daily.  I just want to make sure she is taking just a total of 5 mg daily.

## 2017-10-24 RX ORDER — DONEPEZIL HYDROCHLORIDE 10 MG/1
10 TABLET, FILM COATED ORAL NIGHTLY
Qty: 90 TABLET | Refills: 3 | Status: ON HOLD | OUTPATIENT
Start: 2017-10-24 | End: 2018-02-21

## 2017-10-24 RX ORDER — DONEPEZIL HYDROCHLORIDE 5 MG/1
TABLET, FILM COATED ORAL
Qty: 30 TABLET | Refills: 0 | OUTPATIENT
Start: 2017-10-24

## 2017-10-24 NOTE — PROGRESS NOTES
Subjective:      Michela Martinez is a 88 y.o. right handed female self-referred for evaluation and treatment of cognitive problems. She is accompanied by son. Primary caregiver is adult caretaker.  The family and the patient identify problems with changes in short term memory, day/night changes, getting disoriented outside of familiar environment and intermittent confusion. Family and patient report problems with vivid dreams. Family and patient are concerned about  Decline in cognitive function over last 3 months, however, they are not concerned about medication errors, wandering, driving, cooking and inability to maintain adequate nutrition because patient live assisted living with [facility] [place].  Medication administration: medication managed by Assisted Living Staff.  Has had several falls over last 6 months.  Presently participating in OP PT and using walker.  Patient had 2 hand surgeries over the last 3 months.  Decline in cognitive function probably related to these episodes. Participates in multiple social activities- puzzles, card games.  Staff has noticed cognitive decline.  Patient has been followed be Dr Carranza for dementia management.  Has been taking Namenda 10 mg twice a day for several years with no side effects.  Mother and sister had AD.  Patient is very worried about having AD.    1019/17 Interval History-  Here for follow up.  Comes to appointment alone.  Son was unable to come to appointment today.  Patient continues care at [place].  Feels she has been doing well.Continues on Namenda 10 mg twice a day.  Started on Donepezil titration at her last visit.  Continues of Donepezil 5 mg with no adverse side effects. Patient is repetitive during this visit.   Functional Assessment:   Activities of Daily Living (ADLs):    She is independent in the following: ambulation, feeding, continence and toileting  Requires assistance with the following: bathing and hygiene and dressing  Instrumental  "Activities of Daily Living (IADLs):    Requires assistance with the following: son has POA and manages all of her affairs  Outside reports reviewed: ER records, imaging reports: MRI, lab reports and office notes.    The following portions of the patient's history were reviewed and updated as appropriate: past social history, past surgical history and problem list.    Review of Systems  A comprehensive review of systems was negative except for: Neurological: positive for gait problems, memory problems and dementia      Objective:      BP (!) 140/76   Pulse 66   Resp 20   Ht 5' 2" (1.575 m)   Wt 60.8 kg (134 lb 1.6 oz)   BMI 24.53 kg/m²   General appearance: alert, appears stated age and cooperative  Neurologic: Mental status: Alert, oriented, thought content appropriate, MOCA was administered - Overall Score was 17/30  Normal >26  (see scanned test)  VS/Executive function-1/5; attention-4/6; verbal fluency - 5 words; abstraction-1/2; immediate recall 2/5 after 2 trials; delayed recall- 0 of 5 words (1 with cueing) at 5 mins; 0 (2 with cueing) at 10 mins.    Imaging  Reviewed CT head  12/30/16  1.  No acute intracranial abnormality is visualized.  2.  Generalized involutional changes are seen.  There is prominence of the ventricular system which appears disproportionate to the sulci.  Therefore, hydrocephalus including normal pressure hydrocephalus is not excluded.  Please correlate clinically.  3.  There is advanced periventricular and deep white matter low attenuation which is nonspecific, but is most commonly associated with advanced chronic microvascular ischemic changes.    12/30/16  Carotid US  No significant stenosis of the right internal carotid.  50-69 percent stenosis of the left internal carotid.  Antegrade flow in both vertebrals.    Lab Review     Ref. Range 12/31/2016 03:45   TSH Latest Ref Range: 0.400 - 4.000 uIU/mL 2.300       01/23/17  EEG  IMPRESSION: Normal EEG.   CLINICAL CORRELATION: The " patient is an 87-year-old female who has had multiple  episodes of passing out and there is question whether she could be experiencing  seizures. This tracing, however, shows no evidence for focal cortical   dysfunction nor an epileptic process.     Assessment:      Dementia, moderate      Plan:      Implications of diagnosis explained at length with the patient and caregiver.  All questions answered fully.  MRI brain -Reviewed films with patient and son last visit  CT brain.- Reviewed films with patient and son last visit  EEG -  No seizure activity noted  Increase Donepezil to 10 mg daily   Prescription sent to pharmacy with notes to her son and [facility] staff.  Encouraged to continue cognitive enhancement and social activities.  Follow up in 4 months.

## 2017-11-28 ENCOUNTER — OFFICE VISIT (OUTPATIENT)
Dept: FAMILY MEDICINE | Facility: CLINIC | Age: 82
End: 2017-11-28
Payer: MEDICARE

## 2017-11-28 VITALS
RESPIRATION RATE: 14 BRPM | SYSTOLIC BLOOD PRESSURE: 142 MMHG | OXYGEN SATURATION: 99 % | HEIGHT: 62 IN | TEMPERATURE: 98 F | DIASTOLIC BLOOD PRESSURE: 60 MMHG | HEART RATE: 66 BPM | WEIGHT: 134.69 LBS | BODY MASS INDEX: 24.78 KG/M2

## 2017-11-28 DIAGNOSIS — I10 ESSENTIAL HYPERTENSION: Primary | Chronic | ICD-10-CM

## 2017-11-28 DIAGNOSIS — I95.1 ORTHOSTATIC HYPOTENSION: ICD-10-CM

## 2017-11-28 DIAGNOSIS — F02.818 DEMENTIA ASSOCIATED WITH OTHER UNDERLYING DISEASE WITH BEHAVIORAL DISTURBANCE: ICD-10-CM

## 2017-11-28 DIAGNOSIS — N18.30 CHRONIC KIDNEY DISEASE (CKD), STAGE III (MODERATE): Chronic | ICD-10-CM

## 2017-11-28 PROCEDURE — 99999 PR PBB SHADOW E&M-EST. PATIENT-LVL IV: CPT | Mod: PBBFAC,,, | Performed by: NURSE PRACTITIONER

## 2017-11-28 PROCEDURE — 99214 OFFICE O/P EST MOD 30 MIN: CPT | Mod: S$PBB,,, | Performed by: NURSE PRACTITIONER

## 2017-11-28 PROCEDURE — 99214 OFFICE O/P EST MOD 30 MIN: CPT | Mod: PBBFAC,PO | Performed by: NURSE PRACTITIONER

## 2017-11-28 RX ORDER — MIDODRINE HYDROCHLORIDE 2.5 MG/1
2.5 TABLET ORAL 2 TIMES DAILY WITH MEALS
Qty: 180 TABLET | Refills: 1 | Status: ON HOLD | OUTPATIENT
Start: 2017-11-28 | End: 2018-02-20

## 2017-11-28 NOTE — PROGRESS NOTES
Subjective:       Patient ID: Michela Martinez is a 88 y.o. female.    Chief Complaint: Follow-up (hip and back pain)  She was last seen in primary care by Dr. Lind on 05/16/2017. I last saw her on 03/21/2017. She is accompanied today by her son.  HPI   She is here for her regular 6 month appt for chronic medical diagnosis. She currently resides at the [facility] living [facility]. She was seen in ED on 11/08/2017 with diarrhea. The diarrhea has completely subsided.   Vitals:    11/28/17 0939   BP: (!) 142/60   Pulse: 66   Resp: 14   Temp: 97.6 °F (36.4 °C)     BP Readings from Last 3 Encounters:   11/28/17 (!) 142/60   11/08/17 (!) 172/78   10/19/17 (!) 140/76     CMP  Sodium   Date Value Ref Range Status   11/08/2017 142 136 - 145 mmol/L Final     Potassium   Date Value Ref Range Status   11/08/2017 4.5 3.5 - 5.1 mmol/L Final     Chloride   Date Value Ref Range Status   11/08/2017 106 95 - 110 mmol/L Final     CO2   Date Value Ref Range Status   11/08/2017 29 22 - 31 mmol/L Final     Glucose   Date Value Ref Range Status   11/08/2017 95 70 - 110 mg/dL Final     Comment:     The ADA recommends the following guidelines for fasting glucose:  Normal:       less than 100 mg/dL  Prediabetes:  100 mg/dL to 125 mg/dL  Diabetes:     126 mg/dL or higher       BUN, Bld   Date Value Ref Range Status   11/08/2017 35 (H) 7 - 18 mg/dL Final     Creatinine   Date Value Ref Range Status   11/08/2017 1.35 0.50 - 1.40 mg/dL Final     Calcium   Date Value Ref Range Status   11/08/2017 10.3 (H) 8.4 - 10.2 mg/dL Final     Total Protein   Date Value Ref Range Status   11/08/2017 7.0 6.0 - 8.4 g/dL Final     Albumin   Date Value Ref Range Status   11/08/2017 4.2 3.5 - 5.2 g/dL Final     Total Bilirubin   Date Value Ref Range Status   11/08/2017 0.7 0.2 - 1.3 mg/dL Final     Alkaline Phosphatase   Date Value Ref Range Status   11/08/2017 111 38 - 145 U/L Final     AST ([facility])   Date Value Ref Range Status   01/17/2016 36 14  - 36 U/L Final     AST   Date Value Ref Range Status   11/08/2017 30 14 - 36 U/L Final     ALT   Date Value Ref Range Status   11/08/2017 30 10 - 44 U/L Final     Anion Gap   Date Value Ref Range Status   11/08/2017 7 (L) 8 - 16 mmol/L Final     eGFR if    Date Value Ref Range Status   11/08/2017 40 (A) >60 mL/min/1.73 m^2 Final     eGFR if non    Date Value Ref Range Status   11/08/2017 35 (A) >60 mL/min/1.73 m^2 Final     Comment:     Calculation used to obtain the estimated glomerular filtration  rate (eGFR) is the CKD-EPI equation.        Review of Systems    She is seeing Dr. Alexandre  Objective:      Physical Exam   Constitutional: She is oriented to person, place, and time. Vital signs are normal. She appears well-developed and well-nourished. She is cooperative.   She is using a roller walker to help with safe ambulation   HENT:   Head: Normocephalic and atraumatic.   Right Ear: Hearing and external ear normal.   Left Ear: Hearing and external ear normal.   Nose: Nose normal.   Mouth/Throat: Oropharynx is clear and moist.   Eyes: Lids are normal.   Neck: Normal range of motion and full passive range of motion without pain. Neck supple.   Cardiovascular: Normal rate, regular rhythm, S1 normal and normal heart sounds.    Pulmonary/Chest: Effort normal and breath sounds normal.   Abdominal: Soft. Bowel sounds are normal. There is no tenderness.   Musculoskeletal:        Feet:    Lymphadenopathy:        Head (right side): No submental, no submandibular, no tonsillar, no preauricular, no posterior auricular and no occipital adenopathy present.        Head (left side): No submental, no submandibular, no tonsillar, no preauricular, no posterior auricular and no occipital adenopathy present.     She has no cervical adenopathy.   Neurological: She is alert and oriented to person, place, and time.   Skin: Skin is warm, dry and intact.   Psychiatric: She has a normal mood and affect. Her  speech is normal and behavior is normal. Judgment and thought content normal. Cognition and memory are impaired.   Son states she has some impaired memory   Nursing note and vitals reviewed.      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 Take at 9am with morning medications and at 3pm  Dispense: 180 tablet; Refill: 1    Chronic kidney disease (CKD), stage III (moderate)-Stable  She has not seen her nephrologist since 206. She does need a follow up.    No changes have been made to her daily medication regimen.   She has had her flu vaccine at the [facility] but does not know the date.  Medication List with Changes/Refills   Current Medications    ACETAMINOPHEN (TYLENOL) 325 MG TABLET    Take 325 mg by mouth every 6 (six) hours as needed for Pain.    ASCORBIC ACID, VITAMIN C, (VITAMIN C) 500 MG TABLET    Take 500 mg by mouth once daily.    ATORVASTATIN (LIPITOR) 10 MG TABLET    Take 1 tablet (10 mg total) by mouth every evening.    CALCIUM-VITAMIN D3 (CALCIUM 500 + D) 500 MG(1,250MG) -200 UNIT PER TABLET    Take 1 tablet by mouth 2 (two) times daily with meals.    CLOPIDOGREL (PLAVIX) 75 MG TABLET    Take 1 tablet (75 mg total) by mouth every morning.    CYANOCOBALAMIN (VITAMIN B-12) 500 MCG TABLET    Take 500 mcg by mouth every evening.     DONEPEZIL (ARICEPT) 10 MG TABLET    Take 1 tablet (10 mg total) by mouth every evening.    FERROUS SULFATE 325 (65 FE) MG EC TABLET    Take 325 mg by mouth once daily.    LAMOTRIGINE (LAMICTAL) 150 MG TAB    Take 1 tablet (150 mg total) by mouth 2 (two) times daily.    LISINOPRIL (PRINIVIL,ZESTRIL) 5 MG TABLET    Take 1 tablet (5 mg total) by mouth once daily.    MEMANTINE (NAMENDA) 10 MG TAB    TAKE 1 TABLET BY  MOUTH TWICE DAILY    MULTIVIT-MINERALS/FOLIC ACID (WOMEN'S MULTIVITAMIN GUMMIES ORAL)    Take 1 capsule by mouth 2 (two) times daily.    OXYBUTYNIN (DITROPAN) 5 MG TAB    1/2 tab po bid    VITAMIN E 1000 UNIT CAPSULE    Take 1,000 Units by mouth once daily.   Changed and/or Refilled Medications    Modified Medication Previous Medication    MIDODRINE (PROAMATINE) 2.5 MG TAB midodrine (PROAMATINE) 2.5 MG Tab       Take 1 tablet (2.5 mg total) by mouth 2 (two) times daily with meals. Take at 9am with morning medications and at 3pm    Take 1 tablet (2.5 mg total) by mouth 2 (two) times daily with meals. Take at 9am with morning medications and at 3pm   Discontinued Medications    DICYCLOMINE (BENTYL) 20 MG TABLET    Take 1 tablet (20 mg total) by mouth 2 (two) times daily.    PANTOPRAZOLE (PROTONIX) 40 MG TABLET    TAKE 1 TABLET BY MOUTH EVERY MORNING 30 MINUTES BEFORE BREAKFAST         Return in about 4 months (around 3/28/2018), or if symptoms worsen or fail to improve.

## 2017-12-07 ENCOUNTER — OFFICE VISIT (OUTPATIENT)
Dept: PAIN MEDICINE | Facility: CLINIC | Age: 82
End: 2017-12-07
Payer: MEDICARE

## 2017-12-07 VITALS
HEART RATE: 62 BPM | HEIGHT: 62 IN | WEIGHT: 134.38 LBS | DIASTOLIC BLOOD PRESSURE: 80 MMHG | BODY MASS INDEX: 24.73 KG/M2 | SYSTOLIC BLOOD PRESSURE: 140 MMHG

## 2017-12-07 DIAGNOSIS — M47.816 LUMBAR SPONDYLOSIS: ICD-10-CM

## 2017-12-07 DIAGNOSIS — M48.062 SPINAL STENOSIS OF LUMBAR REGION WITH NEUROGENIC CLAUDICATION: ICD-10-CM

## 2017-12-07 DIAGNOSIS — M43.16 SPONDYLOLISTHESIS OF LUMBAR REGION: ICD-10-CM

## 2017-12-07 DIAGNOSIS — M51.36 DDD (DEGENERATIVE DISC DISEASE), LUMBAR: Primary | ICD-10-CM

## 2017-12-07 PROCEDURE — 99214 OFFICE O/P EST MOD 30 MIN: CPT | Mod: S$PBB,,, | Performed by: ANESTHESIOLOGY

## 2017-12-07 PROCEDURE — 99999 PR PBB SHADOW E&M-EST. PATIENT-LVL III: CPT | Mod: PBBFAC,,, | Performed by: ANESTHESIOLOGY

## 2017-12-07 PROCEDURE — 99213 OFFICE O/P EST LOW 20 MIN: CPT | Mod: PBBFAC,PO | Performed by: ANESTHESIOLOGY

## 2017-12-07 NOTE — PROGRESS NOTES
"This note was completed with dictation software and grammatical errors may exist.    CC:  Low back and right leg pain    HPI:  The patient is an 88-year-old female with past medical history significant for laminectomy, TIA, renal insufficiency, hypertension and peripheral vascular disease who originally presented in referral from Shira Patel PA-C and Dr. Arreola for low back and right leg pain.  We had last seen her in 2015 and had set her up for bilateral L4/5 transforaminal injection, she ended up having this done by Dr. Grimm on the [place], actually did an L5/S1 interlaminar OSWALDO on 2/25/15.  She did not have relief so she eventually underwent minimally invasive laminectomy at L3/4 by Dr. Arreola on 3/17/15.She returns in follow-up today, is by herself so I'm not sure how accurate she is in terms of history, especially timeline since she does have dementia.  She states that she had been doing okay but in the last 6 months began having right buttock pain, lateral hip pain, groin pain and lateral thigh and anterior thigh pain that radiates to the knee.  She denies any radiation further down the leg.  It is worse with standing and walking, improved with sitting down.  It is especially worse when she moves from sitting to standing.  She states that she has had a number of falls in the last year, again unclear about any type of time line.  She denies any numbness, no weakness in the leg, no bowel or bladder incontinence.      Pain intervention history: Laminectomy L4/5 in 2006, L3/4 in 2015.  Currently taking 4 extra strength Tylenol per day.  Taking tramadol 25 mg twice a day.  No recent physical therapy.      ROS:  The patient reports back pain.  Balance of review of systems is negative.    Medical, surgical, family and social history reviewed elsewhere in record.    Medications/Allergies: See med card    Vitals:    12/07/17 1033   BP: (!) 140/80   Pulse: 62   Weight: 61 kg (134 lb 6.4 oz)   Height: 5' 2" (1.575 " m)   PainSc:   7   PainLoc: Hip       Physical exam:  Gen: A and O x3, pleasant, well-groomed  Skin: No rashes or obvious lesions  HEENT: PERRLA, no obvious deformities on ears or in canals. No thyroid masses, trachea midline, no palpable lymph nodes in neck, axilla.  CVS: Regular rate and rhythm, normal S1 and S2, no murmurs.  Resp: Clear to auscultation bilaterally, no wheezes or rales.  Abdomen: Soft, NT/ND, normal bowel sounds present.  Musculoskeletal: Uses a rolling walker, mild antalgic gait, is able to put weight on right foot but not full weight.      Neuro:  Lower extremities: 5/5 strength bilaterally  Reflexes: Patellar 0+, Achilles 20 bilaterally.  Sensory:  Intact and symmetrical to light touch and pinprick in L2-S1 dermatomes bilaterally.    Lumbar spine:  Lumbar spine: Range of motion is mildly reduced with forward flexion, mildly reduced with extension especially oblique extension to the right side causing right buttock pain.  Christopher's test is deferred.    Seated straight leg raise causes no increased pain.  Internal and external rotation of the hip causes no increased pain.    Myofascial exam: No tenderness to palpation across lumbar paraspinous muscles.  Mild tenderness to palpation to the right gluteal region.       Imaging:  CT lumbar spine 1/13/15  There is a grade II anterolisthesis of L4 on L5. The bones appear osteopenic. No acute lumbar compression fracture. No definite osseous destructive process/evidence of osseous metastatic disease. No definite pars defects.  The visualized retroperitoneal soft tissue structures are remarkable for extensive atherosclerotic calcification within the abdominal aorta and prominent splenic artery calcifications. There is diffuse cortical parenchymal thinning also noted within both kidneys. There is diverticulosis coli.  T12-L1: No central canal or neuroforaminal stenosis. No disc protrusion or extrusion.  L1-L2: There is a broad central disc protrusion. No  central canal stenosis or neuroforaminal stenosis.  L2-L3: There is a broad disc bulge. There is bilateral facet arthropathy. There is ligamentum flavum thickening. No central canal or neuroforaminal stenosis. No disc protrusion or extrusion.  L3-L4: There is a broad disc bulge which extends into both neuroforamina. There is severe ligamentum flavum thickening. There is near complete effacement of the thecal sac/severe central canal stenosis. there is moderate right neuroforaminal stenosis.No left neuroforaminal stenosis.  L4-L5: There is a grade II anterolisthesis of L4 on L5. There is ligamentum flavum thickening and severe facet arthropathy resulting in severe central canal stenosis and near complete effacement of the thecal sac. There is mild left neuroforaminal stenosis and moderate-severe right neuroforaminal stenosis.  L5-S1: There is a broad calcified disc bulge. There is bilateral hypertrophic facet arthropathy. There is ligamentum flavum thickening. No overall central canal stenosis. There is bilateral lateral recess stenosis, left greater than right. There is mild-moderate left neuroforaminal stenosis. No right neuroforaminal stenosis.     1/17/17 right hip xray:There is no acute fracture or dislocation.  The height of the hip joint appears maintained.    Assessment:   he patient is an 88-year-old female with past medical history significant for laminectomy, TIA, renal insufficiency, hypertension and peripheral vascular disease who originally presented in referral from Shira Patel PA-C and Dr. Arreola for low back and right leg pain.    1. DDD (degenerative disc disease), lumbar  MRI Lumbar Spine Without Contrast   2. Spinal stenosis of lumbar region with neurogenic claudication  MRI Lumbar Spine Without Contrast   3. Spondylolisthesis of lumbar region     4. Lumbar spondylosis       Plan:  1.  While she does have right buttock and right groin pain, she has no pain on exam of her right hip and x-ray  looks okay, I also looked get a CT of the abdomen which shows the hip which she had done in November, looks fine.  I suspect that this is due to her stenosis or at least facet pain from her spondylolisthesis at L4/5.  I will get a new CT of her lumbar spine and review this, I will contact her with the results to discuss possible interventional procedures to help her pain.  At this point, she is not a candidate for surgery due to age and other comorbidities.    Greater than 50% of this 45min visit was spent educating and counseling this patient.

## 2018-01-24 ENCOUNTER — TELEPHONE (OUTPATIENT)
Dept: PAIN MEDICINE | Facility: CLINIC | Age: 83
End: 2018-01-24

## 2018-01-24 NOTE — TELEPHONE ENCOUNTER
----- Message from Isael Ennis sent at 1/24/2018 11:44 AM CST -----  Contact: Jude Baron, jude [phone] cell, calling to schedule MRI follow up appointment. Next available is 3/5/18. Please advise. Thanks.

## 2018-01-29 ENCOUNTER — HOSPITAL ENCOUNTER (OUTPATIENT)
Dept: RADIOLOGY | Facility: HOSPITAL | Age: 83
Discharge: HOME | End: 2018-01-29
Attending: ANESTHESIOLOGY
Payer: MEDICARE

## 2018-01-29 ENCOUNTER — TELEPHONE (OUTPATIENT)
Dept: PAIN MEDICINE | Facility: CLINIC | Age: 83
End: 2018-01-29

## 2018-01-29 DIAGNOSIS — M48.062 SPINAL STENOSIS OF LUMBAR REGION WITH NEUROGENIC CLAUDICATION: ICD-10-CM

## 2018-01-29 DIAGNOSIS — M51.36 DDD (DEGENERATIVE DISC DISEASE), LUMBAR: ICD-10-CM

## 2018-01-29 PROCEDURE — 72148 MRI LUMBAR SPINE W/O DYE: CPT | Mod: TC,PO

## 2018-01-29 PROCEDURE — 72148 MRI LUMBAR SPINE W/O DYE: CPT | Mod: 26,,, | Performed by: RADIOLOGY

## 2018-01-29 NOTE — TELEPHONE ENCOUNTER
Please let her know that I have reviewed her MRI and it shows fairly severe narrowing at L3/4 and L4/5 which I think is causing her pain.  I would recommend setting up a right L3/4 and L4/5 transforaminal injection on the right side.

## 2018-01-31 ENCOUNTER — TELEPHONE (OUTPATIENT)
Dept: PAIN MEDICINE | Facility: CLINIC | Age: 83
End: 2018-01-31

## 2018-01-31 NOTE — TELEPHONE ENCOUNTER
Spoke with patient's son and reviewed MRI results. He verbalized understanding. Procedure scheduled on 2-20 with 3 week follow. Pre-op instructions reviewed and sent to patient.

## 2018-01-31 NOTE — TELEPHONE ENCOUNTER
----- Message from Kat May sent at 1/31/2018 10:51 AM CST -----  Son (Tod) is returning nurse's (Delma) call/please call back at [phone].

## 2018-02-01 ENCOUNTER — TELEPHONE (OUTPATIENT)
Dept: PAIN MEDICINE | Facility: CLINIC | Age: 83
End: 2018-02-01

## 2018-02-01 DIAGNOSIS — M54.16 LUMBAR RADICULOPATHY: Primary | ICD-10-CM

## 2018-02-01 RX ORDER — ALPRAZOLAM 0.5 MG/1
1 TABLET, ORALLY DISINTEGRATING ORAL ONCE AS NEEDED
Status: CANCELLED | OUTPATIENT
Start: 2018-02-20 | End: 2018-02-20

## 2018-02-01 NOTE — TELEPHONE ENCOUNTER
Patient is getting a Transforaminal epidural steroid injection on 2-20 and will need to stop plavix 7 days before. Please advise if this is okay. Thanks.

## 2018-02-19 DIAGNOSIS — I10 ESSENTIAL HYPERTENSION: ICD-10-CM

## 2018-02-19 DIAGNOSIS — M51.36 DDD (DEGENERATIVE DISC DISEASE), LUMBAR: Primary | ICD-10-CM

## 2018-02-19 DIAGNOSIS — E78.5 HYPERLIPIDEMIA, UNSPECIFIED HYPERLIPIDEMIA TYPE: Primary | ICD-10-CM

## 2018-02-19 DIAGNOSIS — Z86.73 HISTORY OF TIA (TRANSIENT ISCHEMIC ATTACK): ICD-10-CM

## 2018-02-20 ENCOUNTER — TELEPHONE (OUTPATIENT)
Dept: PAIN MEDICINE | Facility: CLINIC | Age: 83
End: 2018-02-20

## 2018-02-20 ENCOUNTER — SURGERY (OUTPATIENT)
Age: 83
End: 2018-02-20

## 2018-02-20 ENCOUNTER — HOSPITAL ENCOUNTER (OUTPATIENT)
Dept: RADIOLOGY | Facility: HOSPITAL | Age: 83
Discharge: HOME | End: 2018-02-20
Attending: ANESTHESIOLOGY
Payer: MEDICARE

## 2018-02-20 DIAGNOSIS — M51.36 DDD (DEGENERATIVE DISC DISEASE), LUMBAR: ICD-10-CM

## 2018-02-20 PROBLEM — I16.0 HYPERTENSIVE URGENCY: Status: ACTIVE | Noted: 2018-02-20

## 2018-02-20 PROBLEM — M54.16 LUMBAR RADICULOPATHY: Status: ACTIVE | Noted: 2018-02-20

## 2018-02-20 NOTE — TELEPHONE ENCOUNTER
Ruth notified staff that patient's procedure was canceled due to elevated blood pressure. Spoke with patient's son and he stated this has happened before. Encouraged to follow up with Dr. Terry to discuss blood pressure and call us back to reschedule.

## 2018-02-20 NOTE — PROGRESS NOTES
Refill Authorization Note     is requesting a refill authorization.    Brief assessment and rationale for refill: APPROVE: needs labs  Amount/Quantity of medication ordered: 90d        Refills Authorized: Yes  If authorized number of refills: 0        Medication-related problems identified: Requires labs  Medication Therapy Plan: Needs new lipids; will order; NTBS; clopidogrel for hx of TIA; BP stable; approve per protocols  Name and strength of medication: atorvastatin / clopidogrel / lisinopril   How patient will take medication: UTD  Medication reconciliation completed: No  Comments:   Lab Results   Component Value Date    CREATININE 1.35 11/08/2017    BUN 35 (H) 11/08/2017     11/08/2017    K 4.5 11/08/2017     11/08/2017    CO2 29 11/08/2017      BP Readings from Last 3 Encounters:   12/07/17 (!) 140/80   11/28/17 (!) 142/60   11/08/17 (!) 172/78      Lab Results   Component Value Date    WBC 6.24 11/08/2017    HGB 13.1 11/08/2017    HCT 37.6 11/08/2017    MCV 99 (H) 11/08/2017     (L) 11/08/2017      Lab Results   Component Value Date    CHOL 152 12/31/2016    CHOL 223 (H) 09/08/2016    CHOL 183 03/17/2016     Lab Results   Component Value Date    HDL 49 12/31/2016    HDL 60 09/08/2016    HDL 62 03/17/2016     Lab Results   Component Value Date    LDLCALC 60.6 (L) 12/31/2016    LDLCALC 120.4 09/08/2016    LDLCALC 94.6 03/17/2016     Lab Results   Component Value Date    TRIG 212 (H) 12/31/2016    TRIG 213 (H) 09/08/2016    TRIG 132 03/17/2016     Lab Results   Component Value Date    CHOLHDL 32.2 12/31/2016    CHOLHDL 26.9 09/08/2016    CHOLHDL 33.9 03/17/2016

## 2018-02-22 PROBLEM — K57.92 ACUTE DIVERTICULITIS: Status: ACTIVE | Noted: 2018-02-22

## 2018-02-22 PROBLEM — I16.0 HYPERTENSIVE URGENCY: Status: RESOLVED | Noted: 2018-02-20 | Resolved: 2018-02-22

## 2018-02-22 RX ORDER — LISINOPRIL 5 MG/1
TABLET ORAL
Qty: 90 TABLET | Refills: 2 | OUTPATIENT
Start: 2018-02-22

## 2018-02-22 RX ORDER — CLOPIDOGREL BISULFATE 75 MG/1
TABLET ORAL
Qty: 90 TABLET | Refills: 0 | OUTPATIENT
Start: 2018-02-22

## 2018-02-22 RX ORDER — ATORVASTATIN CALCIUM 10 MG/1
TABLET, FILM COATED ORAL
Qty: 90 TABLET | Refills: 0 | OUTPATIENT
Start: 2018-02-22

## 2018-02-22 NOTE — TELEPHONE ENCOUNTER
Patient is currently hospital admit. Will not refill at this time and will need to assess her need for meds post hospital.

## 2018-02-23 PROBLEM — R53.81 DEBILITY: Status: ACTIVE | Noted: 2018-02-23

## 2018-02-26 PROBLEM — J96.01 ACUTE HYPOXEMIC RESPIRATORY FAILURE: Status: ACTIVE | Noted: 2018-02-26

## 2018-02-28 PROBLEM — E63.9 INADEQUATE DIETARY ENERGY INTAKE: Status: ACTIVE | Noted: 2018-02-28

## 2018-03-03 PROBLEM — E63.9 INADEQUATE DIETARY ENERGY INTAKE: Status: RESOLVED | Noted: 2018-02-28 | Resolved: 2018-03-03

## 2018-03-27 ENCOUNTER — TELEPHONE (OUTPATIENT)
Dept: FAMILY MEDICINE | Facility: CLINIC | Age: 83
End: 2018-03-27

## 2018-03-27 NOTE — TELEPHONE ENCOUNTER
Patients son came into clinic today and reported to the nurse that patient has moved to a facility in [place], [place]. States [employer] is mailing a form to Dr Lind's office to complete. Advised patient's son that we will contact him if form arrives.

## 2019-08-23 ENCOUNTER — TELEPHONE (OUTPATIENT)
Dept: FAMILY MEDICINE | Facility: CLINIC | Age: 84
End: 2019-08-23

## 2019-10-08 ENCOUNTER — PES CALL (OUTPATIENT)
Dept: ADMINISTRATIVE | Facility: CLINIC | Age: 84
End: 2019-10-08